# Patient Record
Sex: FEMALE | Race: WHITE | NOT HISPANIC OR LATINO | Employment: FULL TIME | URBAN - METROPOLITAN AREA
[De-identification: names, ages, dates, MRNs, and addresses within clinical notes are randomized per-mention and may not be internally consistent; named-entity substitution may affect disease eponyms.]

---

## 2023-04-13 ENCOUNTER — DOCUMENTATION (OUTPATIENT)
Dept: CARE COORDINATION | Facility: CLINIC | Age: 28
End: 2023-04-13
Payer: COMMERCIAL

## 2023-06-15 DIAGNOSIS — F33.40 RECURRENT MAJOR DEPRESSIVE DISORDER, IN REMISSION (CMS-HCC): Primary | ICD-10-CM

## 2023-06-15 PROBLEM — R00.2 HEART PALPITATIONS: Status: ACTIVE | Noted: 2023-06-15

## 2023-06-15 PROBLEM — J30.1 SEASONAL ALLERGIC RHINITIS DUE TO POLLEN: Status: ACTIVE | Noted: 2023-06-15

## 2023-06-15 PROBLEM — B35.4 TINEA CORPORIS: Status: ACTIVE | Noted: 2023-06-15

## 2023-06-15 PROBLEM — E78.2 MIXED HYPERLIPIDEMIA: Status: ACTIVE | Noted: 2023-06-15

## 2023-06-15 PROBLEM — K58.1 IRRITABLE BOWEL SYNDROME WITH CONSTIPATION: Status: ACTIVE | Noted: 2023-06-15

## 2023-06-15 PROBLEM — E53.8 FOLATE DEFICIENCY: Status: ACTIVE | Noted: 2023-06-15

## 2023-06-15 PROBLEM — R00.0 SINUS TACHYCARDIA: Status: ACTIVE | Noted: 2023-06-15

## 2023-06-15 PROBLEM — J45.30 ASTHMA, MILD PERSISTENT (HHS-HCC): Status: ACTIVE | Noted: 2023-06-15

## 2023-06-15 PROBLEM — R79.89 LOW TSH LEVEL: Status: ACTIVE | Noted: 2023-06-15

## 2023-06-15 PROBLEM — M54.50 CHRONIC BILATERAL LOW BACK PAIN WITHOUT SCIATICA: Status: ACTIVE | Noted: 2023-06-15

## 2023-06-15 PROBLEM — M54.2 CERVICALGIA: Status: ACTIVE | Noted: 2023-06-15

## 2023-06-15 PROBLEM — N63.13 MASS OF LOWER OUTER QUADRANT OF RIGHT BREAST: Status: ACTIVE | Noted: 2023-06-15

## 2023-06-15 PROBLEM — F32.9 MAJOR DEPRESSION: Status: ACTIVE | Noted: 2023-06-15

## 2023-06-15 PROBLEM — E53.8 VITAMIN B12 DEFICIENCY: Status: ACTIVE | Noted: 2023-06-15

## 2023-06-15 PROBLEM — R63.4 ABNORMAL WEIGHT LOSS: Status: ACTIVE | Noted: 2023-06-15

## 2023-06-15 PROBLEM — N20.0 KIDNEY STONE ON RIGHT SIDE: Status: ACTIVE | Noted: 2023-06-15

## 2023-06-15 PROBLEM — M99.09 SEGMENTAL AND SOMATIC DYSFUNCTION: Status: ACTIVE | Noted: 2023-06-15

## 2023-06-15 PROBLEM — R09.89 RESPIRATORY SYMPTOMS: Status: ACTIVE | Noted: 2023-06-15

## 2023-06-15 PROBLEM — G89.29 CHRONIC BILATERAL LOW BACK PAIN WITHOUT SCIATICA: Status: ACTIVE | Noted: 2023-06-15

## 2023-06-15 PROBLEM — F41.8 POSTPARTUM ANXIETY (HHS-HCC): Status: ACTIVE | Noted: 2023-06-15

## 2023-06-15 RX ORDER — MONTELUKAST SODIUM 10 MG/1
1 TABLET ORAL DAILY
COMMUNITY
Start: 2022-05-11 | End: 2024-01-22 | Stop reason: SDUPTHER

## 2023-06-15 RX ORDER — ESCITALOPRAM OXALATE 10 MG/1
10 TABLET ORAL DAILY
Qty: 90 TABLET | Refills: 3 | Status: SHIPPED | OUTPATIENT
Start: 2023-06-15 | End: 2023-08-02 | Stop reason: ALTCHOICE

## 2023-06-15 RX ORDER — NEBIVOLOL 5 MG/1
1 TABLET ORAL DAILY
COMMUNITY
End: 2024-01-22 | Stop reason: SDUPTHER

## 2023-06-15 RX ORDER — ALBUTEROL SULFATE 90 UG/1
AEROSOL, METERED RESPIRATORY (INHALATION)
COMMUNITY
Start: 2021-09-27 | End: 2023-10-31 | Stop reason: ALTCHOICE

## 2023-06-15 RX ORDER — ESCITALOPRAM OXALATE 10 MG/1
10 TABLET ORAL DAILY
COMMUNITY
Start: 2023-05-17 | End: 2023-06-15 | Stop reason: SDUPTHER

## 2023-06-19 ENCOUNTER — OFFICE VISIT (OUTPATIENT)
Dept: PRIMARY CARE | Facility: CLINIC | Age: 28
End: 2023-06-19
Payer: COMMERCIAL

## 2023-06-19 ENCOUNTER — TELEPHONE (OUTPATIENT)
Dept: PRIMARY CARE | Facility: CLINIC | Age: 28
End: 2023-06-19

## 2023-06-19 VITALS
WEIGHT: 144.4 LBS | BODY MASS INDEX: 27.26 KG/M2 | HEART RATE: 80 BPM | SYSTOLIC BLOOD PRESSURE: 104 MMHG | HEIGHT: 61 IN | DIASTOLIC BLOOD PRESSURE: 66 MMHG | TEMPERATURE: 97.5 F | OXYGEN SATURATION: 99 %

## 2023-06-19 DIAGNOSIS — Z72.0 TOBACCO ABUSE: Primary | ICD-10-CM

## 2023-06-19 DIAGNOSIS — F17.200 NICOTINE USE DISORDER: ICD-10-CM

## 2023-06-19 DIAGNOSIS — L70.0 ACNE VULGARIS: Primary | ICD-10-CM

## 2023-06-19 DIAGNOSIS — F33.42 RECURRENT MAJOR DEPRESSIVE DISORDER, IN FULL REMISSION (CMS-HCC): ICD-10-CM

## 2023-06-19 DIAGNOSIS — F17.200 VAPING NICOTINE DEPENDENCE, NON-TOBACCO PRODUCT: ICD-10-CM

## 2023-06-19 DIAGNOSIS — R51.9 NONINTRACTABLE EPISODIC HEADACHE, UNSPECIFIED HEADACHE TYPE: Primary | ICD-10-CM

## 2023-06-19 PROCEDURE — 99214 OFFICE O/P EST MOD 30 MIN: CPT | Performed by: FAMILY MEDICINE

## 2023-06-19 RX ORDER — DIPHENHYDRAMINE HCL 25 MG
4 CAPSULE ORAL AS NEEDED
Qty: 100 EACH | Refills: 0 | Status: SHIPPED | OUTPATIENT
Start: 2023-06-19 | End: 2023-06-19 | Stop reason: SDUPTHER

## 2023-06-19 RX ORDER — TRETINOIN 0.1 MG/G
GEL TOPICAL NIGHTLY
Qty: 45 G | Refills: 11 | Status: SHIPPED | OUTPATIENT
Start: 2023-06-19 | End: 2023-08-02 | Stop reason: ALTCHOICE

## 2023-06-19 RX ORDER — DIPHENHYDRAMINE HCL 25 MG
4 CAPSULE ORAL AS NEEDED
Qty: 160 EACH | Refills: 1 | Status: SHIPPED | OUTPATIENT
Start: 2023-06-19 | End: 2023-10-31 | Stop reason: ALTCHOICE

## 2023-06-19 NOTE — PROGRESS NOTES
[facility] needs a new script for the nicotine gum, they are needing a frequency on the directions.

## 2023-06-20 NOTE — PROGRESS NOTES
Subjective   Patient ID: Roxanna Hargrove is a 28 y.o. female who presents for Follow-up.  HPI  The patient is coming to the office today with a chief complaint of fatigue, worsening depression and headaches.  She is currently taking Lexapro 10 mg and she feels its not working well.  When asked how long she feels its been since its stopped working she says about a week.  States that she is sleeping well and denies any thoughts of hurting herself or anybody else.  Feels that her motivation is down.  Does not have the drive to do as much work was accomplished as much.  She is getting 8 hours of sleep.  I had her fill out PHQ-9 and JEMMA-7 and her PHQ-9 was a score of 6.  Her biggest complaints were little interest or pleasure in doing things and having little energy which were both rated as a 2 instead of 3.  Her anxiety scale was JEMMA-7 and was a score of 3.  She also started to have headaches over the past week.  Has 1 every couple days.  It is just an irritating headache and not severe.  Occurring on the right side.  Excedrin taken only as needed helps and she takes it 1-2 times a week.  She denies any nausea or vomiting, changes in her vision, denies that lights bother her eyes or sounds bother her ears.  We discussed the possibility of mold since last week it had drained and she admits that her basement is wet but they have dehumidifier's that are try to dry it out fast.  When asked about nicotine use and the patient states that she has been using her nicotine vape much more.  As we further discussed she recognizes that her nicotine vape has caused her to have more headaches and sees the association between the 2.  She does have a desire to quit using nicotine vape.  Review of Systems    Objective   Physical Exam  General: Patient is alert and orient x3 and appears in no acute distress.      Neck: Decreased range of motion in all planes    Heart: Regular rate and rhythm no murmurs clicks or gallops    Lungs: Clear to  auscultation bilaterally without rhonchi rales or wheezing      Musculoskeletal: Strength was grossly intact.  Deep tendon reflexes intact.  Sensation intact.  Decreased range of motion          Assessment/Plan   Problem List Items Addressed This Visit    None  Depression and anxiety  - PHQ-9 with a score of 6 and JEMMA-7 was a score of 3.  Discussed that we will going to stay on Lexapro 10 mg daily.  I do not feel that she needs to change it just yet but we did discuss taking it at nighttime and seeing if this helps out with her fatigue.    Headaches  - Seems to be associated with her increase in vaping.  We are going to have her start nicotine gum to try and get away from the vaping.  Our hope is that we will be able to decrease the nicotine gum and wean away from the nicotine altogether.

## 2023-07-12 ENCOUNTER — TELEPHONE (OUTPATIENT)
Dept: PRIMARY CARE | Facility: CLINIC | Age: 28
End: 2023-07-12
Payer: COMMERCIAL

## 2023-07-12 NOTE — TELEPHONE ENCOUNTER
Roxanna called and asked if you could please send in her nicotine patches and also wanted to know if you would switch her med to paroxetine?

## 2023-07-14 DIAGNOSIS — F17.200 NICOTINE USE DISORDER: Primary | ICD-10-CM

## 2023-07-14 RX ORDER — IBUPROFEN 200 MG
1 TABLET ORAL EVERY 24 HOURS
Qty: 30 PATCH | Refills: 0 | Status: SHIPPED | OUTPATIENT
Start: 2023-07-14 | End: 2023-08-02 | Stop reason: ALTCHOICE

## 2023-07-25 ENCOUNTER — APPOINTMENT (OUTPATIENT)
Dept: PRIMARY CARE | Facility: CLINIC | Age: 28
End: 2023-07-25
Payer: COMMERCIAL

## 2023-07-25 ENCOUNTER — OFFICE VISIT (OUTPATIENT)
Dept: PRIMARY CARE | Facility: CLINIC | Age: 28
End: 2023-07-25
Payer: COMMERCIAL

## 2023-07-25 DIAGNOSIS — F33.42 RECURRENT MAJOR DEPRESSIVE DISORDER, IN FULL REMISSION (CMS-HCC): Primary | ICD-10-CM

## 2023-07-25 PROCEDURE — 99213 OFFICE O/P EST LOW 20 MIN: CPT | Performed by: FAMILY MEDICINE

## 2023-07-25 RX ORDER — PAROXETINE HYDROCHLORIDE 20 MG/1
20 TABLET, FILM COATED ORAL EVERY MORNING
Qty: 30 TABLET | Refills: 1 | Status: SHIPPED | OUTPATIENT
Start: 2023-07-25 | End: 2023-08-21 | Stop reason: ALTCHOICE

## 2023-07-25 ASSESSMENT — PATIENT HEALTH QUESTIONNAIRE - PHQ9
2. FEELING DOWN, DEPRESSED OR HOPELESS: NEARLY EVERY DAY
10. IF YOU CHECKED OFF ANY PROBLEMS, HOW DIFFICULT HAVE THESE PROBLEMS MADE IT FOR YOU TO DO YOUR WORK, TAKE CARE OF THINGS AT HOME, OR GET ALONG WITH OTHER PEOPLE: VERY DIFFICULT
SUM OF ALL RESPONSES TO PHQ9 QUESTIONS 1 AND 2: 6
1. LITTLE INTEREST OR PLEASURE IN DOING THINGS: NEARLY EVERY DAY

## 2023-07-25 NOTE — PROGRESS NOTES
Subjective   Patient ID: Roxanna Hargrove is a 28 y.o. female who presents for Follow-up (Discuss meds).  HPI  Has worsening energy on th lexapro. 2/10 on a scale of 1-10.  She is sleeping too much.  She took 3 hour nap and feels like she could sleep at anytime.   Not having irregular bleeding.  Stress is doing well.     Duloxetine did alright on.  Wellbutrin made her more emotional  No other medications.   Review of Systems    Objective   Physical Exam  General: Patient is alert and oriented x3 and appears in no acute distress    Heart: Regular rate and rhythm    Lungs: Clear to auscultation    Extremities: No cyanosis clubbing or edema  Assessment/Plan   Problem List Items Addressed This Visit       Major depression - Primary    Relevant Medications    PARoxetine (Paxil) 20 mg tablet   Paroxetine 20 mg daily started  Lexapro 5 mg daily continued  Follow up in 4 weeks  PHQ9 was 14 and JEMMA 7 was 3

## 2023-07-28 LAB
BASOPHILS (10*3/UL) IN BLOOD BY AUTOMATED COUNT: 0.05 X10E9/L (ref 0–0.1)
BASOPHILS/100 LEUKOCYTES IN BLOOD BY AUTOMATED COUNT: 0.6 % (ref 0–2)
CHORIOGONADOTROPIN (MIU/ML) IN SER/PLAS: <2 MIU/ML
DEHYDROEPIANDROSTERONE SULFATE (DHEA-S) (UG/DL) IN SER/: 270 UG/DL (ref 65–395)
EOSINOPHILS (10*3/UL) IN BLOOD BY AUTOMATED COUNT: 0.05 X10E9/L (ref 0–0.7)
EOSINOPHILS/100 LEUKOCYTES IN BLOOD BY AUTOMATED COUNT: 0.6 % (ref 0–6)
ERYTHROCYTE DISTRIBUTION WIDTH (RATIO) BY AUTOMATED COUNT: 13.3 % (ref 11.5–14.5)
ERYTHROCYTE MEAN CORPUSCULAR HEMOGLOBIN CONCENTRATION (G/DL) BY AUTOMATED: 31.5 G/DL (ref 32–36)
ERYTHROCYTE MEAN CORPUSCULAR VOLUME (FL) BY AUTOMATED COUNT: 86 FL (ref 80–100)
ERYTHROCYTES (10*6/UL) IN BLOOD BY AUTOMATED COUNT: 4.81 X10E12/L (ref 4–5.2)
ESTRADIOL (PG/ML) IN SER/PLAS: 40 PG/ML
FOLLITROPIN (IU/L) IN SER/PLAS: 7.5 IU/L
HEMATOCRIT (%) IN BLOOD BY AUTOMATED COUNT: 41.3 % (ref 36–46)
HEMOGLOBIN (G/DL) IN BLOOD: 13 G/DL (ref 12–16)
IMMATURE GRANULOCYTES/100 LEUKOCYTES IN BLOOD BY AUTOMATED COUNT: 0.2 % (ref 0–0.9)
LEUKOCYTES (10*3/UL) IN BLOOD BY AUTOMATED COUNT: 8.9 X10E9/L (ref 4.4–11.3)
LYMPHOCYTES (10*3/UL) IN BLOOD BY AUTOMATED COUNT: 2.95 X10E9/L (ref 1.2–4.8)
LYMPHOCYTES/100 LEUKOCYTES IN BLOOD BY AUTOMATED COUNT: 33 % (ref 13–44)
MONOCYTES (10*3/UL) IN BLOOD BY AUTOMATED COUNT: 0.64 X10E9/L (ref 0.1–1)
MONOCYTES/100 LEUKOCYTES IN BLOOD BY AUTOMATED COUNT: 7.2 % (ref 2–10)
NEUTROPHILS (10*3/UL) IN BLOOD BY AUTOMATED COUNT: 5.22 X10E9/L (ref 1.2–7.7)
NEUTROPHILS/100 LEUKOCYTES IN BLOOD BY AUTOMATED COUNT: 58.4 % (ref 40–80)
PLATELETS (10*3/UL) IN BLOOD AUTOMATED COUNT: 290 X10E9/L (ref 150–450)
PROGESTERONE (NG/ML) IN SER/PLAS: 0.7 NG/ML
PROLACTIN (UG/L) IN SER/PLAS: 10 UG/L (ref 3–20)
TESTOSTERONE (NG/DL) IN SER/PLAS: <60 NG/DL (ref 0–70)
THYROTROPIN (MIU/L) IN SER/PLAS BY DETECTION LIMIT <= 0.05 MIU/L: 1 MIU/L (ref 0.44–3.98)

## 2023-08-02 ENCOUNTER — TELEPHONE (OUTPATIENT)
Dept: PRIMARY CARE | Facility: CLINIC | Age: 28
End: 2023-08-02
Payer: COMMERCIAL

## 2023-08-02 DIAGNOSIS — J01.00 ACUTE NON-RECURRENT MAXILLARY SINUSITIS: Primary | ICD-10-CM

## 2023-08-02 RX ORDER — AZITHROMYCIN 250 MG/1
TABLET, FILM COATED ORAL
Qty: 6 TABLET | Refills: 0 | Status: SHIPPED | OUTPATIENT
Start: 2023-08-02 | End: 2023-08-07

## 2023-08-25 DIAGNOSIS — E55.9 VITAMIN D DEFICIENCY: ICD-10-CM

## 2023-08-25 DIAGNOSIS — G43.019 INTRACTABLE MIGRAINE WITHOUT AURA AND WITHOUT STATUS MIGRAINOSUS: Primary | ICD-10-CM

## 2023-08-25 RX ORDER — SUMATRIPTAN SUCCINATE 100 MG/1
100 TABLET ORAL ONCE AS NEEDED
Qty: 10 TABLET | Refills: 3 | Status: SHIPPED | OUTPATIENT
Start: 2023-08-25 | End: 2023-08-25 | Stop reason: SDUPTHER

## 2023-08-25 RX ORDER — SUMATRIPTAN SUCCINATE 100 MG/1
100 TABLET ORAL ONCE AS NEEDED
Qty: 10 TABLET | Refills: 3 | Status: SHIPPED | OUTPATIENT
Start: 2023-08-25 | End: 2023-10-31 | Stop reason: SINTOL

## 2023-08-30 RX ORDER — ERGOCALCIFEROL 1.25 MG/1
1.25 CAPSULE ORAL
COMMUNITY
End: 2023-08-30 | Stop reason: SDUPTHER

## 2023-08-30 RX ORDER — ERGOCALCIFEROL 1.25 MG/1
1.25 CAPSULE ORAL
Qty: 12 CAPSULE | Refills: 1 | Status: SHIPPED | OUTPATIENT
Start: 2023-08-30 | End: 2023-10-31 | Stop reason: ALTCHOICE

## 2023-09-05 ENCOUNTER — TELEPHONE (OUTPATIENT)
Dept: PRIMARY CARE | Facility: CLINIC | Age: 28
End: 2023-09-05
Payer: COMMERCIAL

## 2023-09-05 RX ORDER — VENLAFAXINE 25 MG/1
25 TABLET ORAL DAILY
OUTPATIENT
Start: 2023-09-05

## 2023-09-10 DIAGNOSIS — J01.00 ACUTE NON-RECURRENT MAXILLARY SINUSITIS: Primary | ICD-10-CM

## 2023-09-10 RX ORDER — DOXYCYCLINE 100 MG/1
100 CAPSULE ORAL 2 TIMES DAILY
Qty: 14 CAPSULE | Refills: 0 | Status: SHIPPED | OUTPATIENT
Start: 2023-09-10 | End: 2023-09-17

## 2023-09-14 ENCOUNTER — TELEPHONE (OUTPATIENT)
Dept: PRIMARY CARE | Facility: CLINIC | Age: 28
End: 2023-09-14
Payer: COMMERCIAL

## 2023-09-14 DIAGNOSIS — N30.00 ACUTE CYSTITIS WITHOUT HEMATURIA: Primary | ICD-10-CM

## 2023-09-14 RX ORDER — NITROFURANTOIN 25; 75 MG/1; MG/1
100 CAPSULE ORAL 2 TIMES DAILY
Qty: 10 CAPSULE | Refills: 0 | Status: SHIPPED | OUTPATIENT
Start: 2023-09-14 | End: 2023-09-19

## 2023-09-19 DIAGNOSIS — F33.42 RECURRENT MAJOR DEPRESSIVE DISORDER, IN FULL REMISSION (CMS-HCC): Primary | ICD-10-CM

## 2023-09-19 RX ORDER — VENLAFAXINE 25 MG/1
25 TABLET ORAL 2 TIMES DAILY
Qty: 180 TABLET | Refills: 0 | Status: SHIPPED | OUTPATIENT
Start: 2023-09-19 | End: 2023-10-31 | Stop reason: SINTOL

## 2023-10-02 VITALS
WEIGHT: 160 LBS | SYSTOLIC BLOOD PRESSURE: 110 MMHG | OXYGEN SATURATION: 99 % | TEMPERATURE: 97.3 F | HEART RATE: 72 BPM | DIASTOLIC BLOOD PRESSURE: 70 MMHG | HEIGHT: 61 IN | BODY MASS INDEX: 30.21 KG/M2

## 2023-10-31 ENCOUNTER — APPOINTMENT (OUTPATIENT)
Dept: PRIMARY CARE | Facility: CLINIC | Age: 28
End: 2023-10-31

## 2023-10-31 ENCOUNTER — OFFICE VISIT (OUTPATIENT)
Dept: PRIMARY CARE | Facility: CLINIC | Age: 28
End: 2023-10-31
Payer: COMMERCIAL

## 2023-10-31 VITALS
BODY MASS INDEX: 30.58 KG/M2 | RESPIRATION RATE: 16 BRPM | HEIGHT: 61 IN | HEART RATE: 109 BPM | SYSTOLIC BLOOD PRESSURE: 110 MMHG | WEIGHT: 162 LBS | DIASTOLIC BLOOD PRESSURE: 74 MMHG | OXYGEN SATURATION: 98 %

## 2023-10-31 DIAGNOSIS — R41.840 INATTENTION: Primary | ICD-10-CM

## 2023-10-31 PROBLEM — U07.1 COVID: Status: ACTIVE | Noted: 2023-10-31

## 2023-10-31 PROBLEM — O35.03X0: Status: RESOLVED | Noted: 2023-10-31 | Resolved: 2023-10-31

## 2023-10-31 PROBLEM — F41.1 GENERALIZED ANXIETY DISORDER: Status: ACTIVE | Noted: 2023-10-31

## 2023-10-31 PROBLEM — B37.9 YEAST INFECTION: Status: RESOLVED | Noted: 2023-10-31 | Resolved: 2023-10-31

## 2023-10-31 PROBLEM — R73.03 PREDIABETES: Status: ACTIVE | Noted: 2023-10-31

## 2023-10-31 PROBLEM — O35.EXX0 PYELECTASIS OF FETUS ON PRENATAL ULTRASOUND (HHS-HCC): Status: RESOLVED | Noted: 2023-10-31 | Resolved: 2023-10-31

## 2023-10-31 PROBLEM — J01.00 ACUTE ANTRITIS: Status: RESOLVED | Noted: 2023-10-31 | Resolved: 2023-10-31

## 2023-10-31 PROBLEM — R25.1 SHAKY: Status: ACTIVE | Noted: 2023-10-31

## 2023-10-31 PROBLEM — R05.9 COUGH: Status: RESOLVED | Noted: 2023-10-31 | Resolved: 2023-10-31

## 2023-10-31 PROBLEM — L02.92 BOIL: Status: RESOLVED | Noted: 2023-10-31 | Resolved: 2023-10-31

## 2023-10-31 PROBLEM — M99.09 SEGMENTAL AND SOMATIC DYSFUNCTION: Status: RESOLVED | Noted: 2023-06-15 | Resolved: 2023-10-31

## 2023-10-31 PROBLEM — N39.0 ACUTE UTI: Status: ACTIVE | Noted: 2023-10-31

## 2023-10-31 PROBLEM — R00.0 SINUS TACHYCARDIA: Status: RESOLVED | Noted: 2023-06-15 | Resolved: 2023-10-31

## 2023-10-31 PROBLEM — F51.02 INSOMNIA, TRANSIENT: Status: ACTIVE | Noted: 2023-10-31

## 2023-10-31 PROBLEM — O35.EXX0 PYELECTASIS OF FETUS ON PRENATAL ULTRASOUND (HHS-HCC): Status: ACTIVE | Noted: 2023-10-31

## 2023-10-31 PROBLEM — N92.6 IRREGULAR BLEEDING: Status: ACTIVE | Noted: 2023-10-31

## 2023-10-31 PROBLEM — L02.91 ABSCESS: Status: ACTIVE | Noted: 2023-10-31

## 2023-10-31 PROBLEM — R53.83 FATIGUE: Status: ACTIVE | Noted: 2023-10-31

## 2023-10-31 PROBLEM — Z20.822 SUSPECTED COVID-19 VIRUS INFECTION: Status: ACTIVE | Noted: 2023-10-31

## 2023-10-31 PROBLEM — L73.9 FOLLICULITIS: Status: RESOLVED | Noted: 2023-10-31 | Resolved: 2023-10-31

## 2023-10-31 PROBLEM — Z20.822 SUSPECTED COVID-19 VIRUS INFECTION: Status: RESOLVED | Noted: 2023-10-31 | Resolved: 2023-10-31

## 2023-10-31 PROBLEM — H66.001 NON-RECURRENT ACUTE SUPPURATIVE OTITIS MEDIA OF RIGHT EAR WITHOUT SPONTANEOUS RUPTURE OF TYMPANIC MEMBRANE: Status: RESOLVED | Noted: 2023-10-31 | Resolved: 2023-10-31

## 2023-10-31 PROBLEM — R10.2 PELVIC PAIN IN FEMALE: Status: RESOLVED | Noted: 2023-10-31 | Resolved: 2023-10-31

## 2023-10-31 PROBLEM — R25.1 SHAKY: Status: RESOLVED | Noted: 2023-10-31 | Resolved: 2023-10-31

## 2023-10-31 PROBLEM — G43.909 MIGRAINES: Status: ACTIVE | Noted: 2023-10-31

## 2023-10-31 PROBLEM — R79.89 LOW TSH LEVEL: Status: RESOLVED | Noted: 2023-06-15 | Resolved: 2023-10-31

## 2023-10-31 PROBLEM — B35.4 TINEA CORPORIS: Status: RESOLVED | Noted: 2023-06-15 | Resolved: 2023-10-31

## 2023-10-31 PROBLEM — Z20.822 EXPOSURE TO COVID-19 VIRUS: Status: ACTIVE | Noted: 2023-10-31

## 2023-10-31 PROBLEM — H66.001 NON-RECURRENT ACUTE SUPPURATIVE OTITIS MEDIA OF RIGHT EAR WITHOUT SPONTANEOUS RUPTURE OF TYMPANIC MEMBRANE: Status: ACTIVE | Noted: 2023-10-31

## 2023-10-31 PROBLEM — R05.9 COUGH: Status: ACTIVE | Noted: 2023-10-31

## 2023-10-31 PROBLEM — R09.81 CONGESTION OF NASAL SINUS: Status: ACTIVE | Noted: 2023-10-31

## 2023-10-31 PROBLEM — J40 BRONCHITIS: Status: RESOLVED | Noted: 2023-10-31 | Resolved: 2023-10-31

## 2023-10-31 PROBLEM — Z20.822 EXPOSURE TO COVID-19 VIRUS: Status: RESOLVED | Noted: 2023-10-31 | Resolved: 2023-10-31

## 2023-10-31 PROBLEM — O35.03X0: Status: ACTIVE | Noted: 2023-10-31

## 2023-10-31 PROBLEM — M54.2 DORSALGIA OF CERVICOTHORACIC REGION: Status: RESOLVED | Noted: 2023-10-31 | Resolved: 2023-10-31

## 2023-10-31 PROBLEM — O21.0 HYPEREMESIS GRAVIDARUM (HHS-HCC): Status: RESOLVED | Noted: 2023-10-31 | Resolved: 2023-10-31

## 2023-10-31 PROBLEM — R52 BODY ACHES: Status: RESOLVED | Noted: 2023-10-31 | Resolved: 2023-10-31

## 2023-10-31 PROBLEM — R09.81 CONGESTION OF NASAL SINUS: Status: RESOLVED | Noted: 2023-10-31 | Resolved: 2023-10-31

## 2023-10-31 PROBLEM — M54.6 DORSALGIA OF CERVICOTHORACIC REGION: Status: ACTIVE | Noted: 2023-10-31

## 2023-10-31 PROBLEM — R30.0 DYSURIA: Status: RESOLVED | Noted: 2023-10-31 | Resolved: 2023-10-31

## 2023-10-31 PROBLEM — J40 BRONCHITIS: Status: ACTIVE | Noted: 2023-10-31

## 2023-10-31 PROBLEM — R73.02 IGT (IMPAIRED GLUCOSE TOLERANCE): Status: ACTIVE | Noted: 2023-10-31

## 2023-10-31 PROBLEM — H60.90 OTITIS EXTERNA: Status: ACTIVE | Noted: 2023-10-31

## 2023-10-31 PROBLEM — N39.0 ACUTE UTI: Status: RESOLVED | Noted: 2023-10-31 | Resolved: 2023-10-31

## 2023-10-31 PROBLEM — U07.1 COVID: Status: RESOLVED | Noted: 2023-10-31 | Resolved: 2023-10-31

## 2023-10-31 PROBLEM — R63.4 ABNORMAL WEIGHT LOSS: Status: RESOLVED | Noted: 2023-06-15 | Resolved: 2023-10-31

## 2023-10-31 PROBLEM — F41.8 POSTPARTUM ANXIETY (HHS-HCC): Status: RESOLVED | Noted: 2023-06-15 | Resolved: 2023-10-31

## 2023-10-31 PROBLEM — B37.9 YEAST INFECTION: Status: ACTIVE | Noted: 2023-10-31

## 2023-10-31 PROBLEM — M54.6 DORSALGIA OF CERVICOTHORACIC REGION: Status: RESOLVED | Noted: 2023-10-31 | Resolved: 2023-10-31

## 2023-10-31 PROBLEM — L73.9 FOLLICULITIS: Status: ACTIVE | Noted: 2023-10-31

## 2023-10-31 PROBLEM — L02.92 BOIL: Status: ACTIVE | Noted: 2023-10-31

## 2023-10-31 PROBLEM — R09.89 RESPIRATORY SYMPTOMS: Status: RESOLVED | Noted: 2023-06-15 | Resolved: 2023-10-31

## 2023-10-31 PROBLEM — R52 BODY ACHES: Status: ACTIVE | Noted: 2023-10-31

## 2023-10-31 PROBLEM — R53.83 FATIGUE: Status: RESOLVED | Noted: 2023-10-31 | Resolved: 2023-10-31

## 2023-10-31 PROBLEM — M54.2 CERVICALGIA: Status: RESOLVED | Noted: 2023-06-15 | Resolved: 2023-10-31

## 2023-10-31 PROBLEM — O21.0 HYPEREMESIS GRAVIDARUM (HHS-HCC): Status: ACTIVE | Noted: 2023-10-31

## 2023-10-31 PROBLEM — M54.2 DORSALGIA OF CERVICOTHORACIC REGION: Status: ACTIVE | Noted: 2023-10-31

## 2023-10-31 PROBLEM — R10.2 PELVIC PAIN IN FEMALE: Status: ACTIVE | Noted: 2023-10-31

## 2023-10-31 PROBLEM — R30.0 DYSURIA: Status: ACTIVE | Noted: 2023-10-31

## 2023-10-31 PROBLEM — H60.90 OTITIS EXTERNA: Status: RESOLVED | Noted: 2023-10-31 | Resolved: 2023-10-31

## 2023-10-31 PROBLEM — J01.00 ACUTE ANTRITIS: Status: ACTIVE | Noted: 2023-10-31

## 2023-10-31 PROCEDURE — 99213 OFFICE O/P EST LOW 20 MIN: CPT | Performed by: FAMILY MEDICINE

## 2023-10-31 PROCEDURE — 1036F TOBACCO NON-USER: CPT | Performed by: FAMILY MEDICINE

## 2023-10-31 RX ORDER — ATOMOXETINE 40 MG/1
40 CAPSULE ORAL DAILY
Qty: 30 CAPSULE | Refills: 0 | Status: SHIPPED | OUTPATIENT
Start: 2023-10-31 | End: 2023-11-07 | Stop reason: ALTCHOICE

## 2023-10-31 ASSESSMENT — ENCOUNTER SYMPTOMS
NAUSEA: 0
CHILLS: 0
SINUS PAIN: 0
ABDOMINAL PAIN: 0
HEMATURIA: 0
DYSURIA: 0
NUMBNESS: 0
SHORTNESS OF BREATH: 0
WHEEZING: 0
DIAPHORESIS: 0
PALPITATIONS: 0
FREQUENCY: 0
POLYDIPSIA: 0
CONSTIPATION: 0
NERVOUS/ANXIOUS: 0
VOMITING: 0
FEVER: 0
UNEXPECTED WEIGHT CHANGE: 0
SINUS PRESSURE: 0
CONFUSION: 0
CHEST TIGHTNESS: 0
DIARRHEA: 0
DECREASED CONCENTRATION: 1
COUGH: 0
DYSPHORIC MOOD: 0
ADENOPATHY: 0
SORE THROAT: 0
POLYPHAGIA: 0
DIZZINESS: 0
HEADACHES: 0
LIGHT-HEADEDNESS: 0

## 2023-10-31 NOTE — PATIENT INSTRUCTIONS
Roxanna Hargrove ,    Thank you for coming in today. We at [facility] appreciate your trust in our care. If you have any questions or concerns about the care you received today, please do not hesitate to contact us at [phone].    The following instructions were discussed today:    - START strattera 40 mg daily   - Follow up in 1 month.

## 2023-10-31 NOTE — PROGRESS NOTES
Subjective   Patient ID: Roxanna Hargrove is a 28 y.o. female who presents for medication discussion.    HPI   Patient thinks she may have ADHD. This runs in her family. Has a difficult time focusing and concentrating. Gets overwhelmed easily. She denies depression currently, but does have history of that. Denies thoughts of harm to self or others.  Does have history of tachycardia and is on bystolic for that. Did see cardiology. Has had EKG that showed sinus tachycardia. Has been on wellbutrin in the past but that made her symptoms worse.     Orders Only on 07/28/2023   Component Date Value Ref Range Status    Testosterone 07/28/2023 <60  0 - 70 ng/dL Final    Comment:  Nandrolone decanoate, 11 Beta-hydroxytestosterone, androstenedione,   testosterone propionate and 11-keto-testosterone strongly cross    react with this test method.    Biotin interference may cause falsely elevated results. Patients   taking a Biotin dose of up to 5 mg/day should refrain from taking   Biotin for 24 hours before sample collection. Providers may contact   their local laboratory for further information.   Testing by a more sensitive method (Testosterone Total LC-MS/MS)   is recommended for accurate quantification of testosterone    levels less than 60 ng/dL.      Estradiol 07/28/2023 40  pg/mL Final    Comment: REF VALUES  FOLLICULAR PHASE      MID CYCLE             LUTEAL PHASE          POSTMENOPAUSE         < 32  PREPUBERTY            < 20  FEMALE 10-18Y        8-110  MALE   10-18Y         < 20  ADULT MALE            < 40      WBC 07/28/2023 8.9  4.4 - 11.3 x10E9/L Final    RBC 07/28/2023 4.81  4.00 - 5.20 x10E12/L Final    Hemoglobin 07/28/2023 13.0  12.0 - 16.0 g/dL Final    Hematocrit 07/28/2023 41.3  36.0 - 46.0 % Final    MCV 07/28/2023 86  80 - 100 fL Final    MCHC 07/28/2023 31.5 (L)  32.0 - 36.0 g/dL Final    Platelets 07/28/2023 290  150 - 450 x10E9/L Final    RDW 07/28/2023 13.3  11.5 - 14.5 % Final     Neutrophils % 07/28/2023 58.4  40.0 - 80.0 % Final    Immature Granulocytes %, Automated 07/28/2023 0.2  0.0 - 0.9 % Final    Comment:  Immature Granulocyte Count (IG) includes promyelocytes,    myelocytes and metamyelocytes but does not include bands.   Percent differential counts (%) should be interpreted in the   context of the absolute cell counts (cells/L).      Lymphocytes % 07/28/2023 33.0  13.0 - 44.0 % Final    Monocytes % 07/28/2023 7.2  2.0 - 10.0 % Final    Eosinophils % 07/28/2023 0.6  0.0 - 6.0 % Final    Basophils % 07/28/2023 0.6  0.0 - 2.0 % Final    Neutrophils Absolute 07/28/2023 5.22  1.20 - 7.70 x10E9/L Final    Lymphocytes Absolute 07/28/2023 2.95  1.20 - 4.80 x10E9/L Final    Monocytes Absolute 07/28/2023 0.64  0.10 - 1.00 x10E9/L Final    Eosinophils Absolute 07/28/2023 0.05  0.00 - 0.70 x10E9/L Final    Basophils Absolute 07/28/2023 0.05  0.00 - 0.10 x10E9/L Final    DHEA Sulfate 07/28/2023 270  65 - 395 ug/dL Final    Comment: MATURITY-BASED REFERENCE RANGES:        PUBERTAL (KIMI) STAGE    MALE      FEMALE       ---------------------------------------------------                 I           5 - 265     5 - 125                  II          15 - 380    15 - 150                 III          60 - 505    20 - 535                            IV          65 - 560    35 - 485                      V         165 - 500    75 - 530       Biotin interference may cause falsely elevated results.   Patients taking a Biotin dose of up to 5 mg/day should   refrain from taking Biotin for 24 hours before sample   collection. Providers may contact their local laboratory  for further information.      Prolactin 07/28/2023 10.0  3.0 - 20.0 ug/L Final    TSH 07/28/2023 1.00  0.44 - 3.98 mIU/L Final    Comment:  TSH testing is performed using different testing    methodology at [facility] than at other    [facility]. Direct result comparisons should    only be made within the same method.       hCG Quantitative 07/28/2023 <2  mIU/mL Final    Comment: .  Total HCG measurement is performed using the [employer] Access  Immunoassay which detects intact HCG and free beta HCG subunit.   .  This test is not indicated for use as a tumor marker.  HCG testing is performed using a different test methodology at [facility] than other [facility]. Direct result comparison  should only be made within the same method.   REF VALUES  NONPREGNANT FEMALE <5  MALES              <5      Progesterone 07/28/2023 0.7  ng/mL Final    Comment: REF VALUES  MALE              <0.3- 1.2    FOLLICULAR PHASE  <0.3- 1.4       LUTEAL PHASE       3.3-25.6     MID-LUTEAL PHASE   4.4-28.0  POSTMENOPAUSAL    <0.3- 0.7  PREGNANT FEMALES:     1ST TRIMESTER     11.2- 90.0         2ND TRIMESTER     25.6- 89.4     3RD TRIMESTER     48.4-422.5   .  Patients receiving DHEA-S supplements may show false elevation of  progesterone for results near 1.0 ng/mL. Contact laboratory at  [phone] if alternative testing is needed.      Follicle Stimulating Hormone 07/28/2023 7.5  IU/L Final    Comment: REF VALUES  FOLLICULAR  2-12  MID-CYCLE     12-25  LUTEAL PHASE   2-12  MENOPAUSE       PREPUBERTY    50% ADULT  ADULT MALE     2-10  INFANTS        0-1          Review of Systems   Constitutional:  Negative for chills, diaphoresis, fever and unexpected weight change.   HENT:  Negative for congestion, sinus pressure, sinus pain, sneezing and sore throat.    Respiratory:  Negative for cough, chest tightness, shortness of breath and wheezing.    Cardiovascular:  Negative for chest pain, palpitations and leg swelling.   Gastrointestinal:  Negative for abdominal pain, constipation, diarrhea, nausea and vomiting.   Endocrine: Negative for cold intolerance, heat intolerance, polydipsia, polyphagia and polyuria.   Genitourinary:  Negative for dysuria, frequency, hematuria and urgency.   Neurological:  Negative for dizziness, syncope,  "light-headedness, numbness and headaches.   Hematological:  Negative for adenopathy.   Psychiatric/Behavioral:  Positive for decreased concentration. Negative for confusion and dysphoric mood. The patient is not nervous/anxious.        Objective   /74 (BP Location: Right arm, Patient Position: Sitting, BP Cuff Size: Large adult)   Pulse 109   Resp 16   Ht 1.549 m (5' 1\")   Wt 73.5 kg (162 lb)   SpO2 98%   BMI 30.61 kg/m²     Physical Exam  Vitals and nursing note reviewed.   Constitutional:       General: She is not in acute distress.     Appearance: Normal appearance.   HENT:      Head: Normocephalic and atraumatic.      Nose: Nose normal.   Eyes:      Extraocular Movements: Extraocular movements intact.      Conjunctiva/sclera: Conjunctivae normal.      Pupils: Pupils are equal, round, and reactive to light.   Cardiovascular:      Rate and Rhythm: Normal rate and regular rhythm.      Heart sounds: No murmur heard.     No friction rub. No gallop.   Pulmonary:      Effort: Pulmonary effort is normal.      Breath sounds: Normal breath sounds. No wheezing, rhonchi or rales.   Abdominal:      General: Bowel sounds are normal. There is no distension.      Palpations: Abdomen is soft.      Tenderness: There is no abdominal tenderness.   Musculoskeletal:         General: Normal range of motion.      Cervical back: Normal range of motion and neck supple.   Skin:     General: Skin is warm and dry.   Neurological:      General: No focal deficit present.      Mental Status: She is alert and oriented to person, place, and time.      Deep Tendon Reflexes: Reflexes normal.   Psychiatric:         Mood and Affect: Mood normal.         Behavior: Behavior normal.         Thought Content: Thought content normal.         Judgment: Judgment normal.         Assessment/Plan          "

## 2023-11-01 ENCOUNTER — TELEPHONE (OUTPATIENT)
Dept: PRIMARY CARE | Facility: CLINIC | Age: 28
End: 2023-11-01
Payer: COMMERCIAL

## 2023-11-01 DIAGNOSIS — F90.2 ATTENTION DEFICIT HYPERACTIVITY DISORDER (ADHD), COMBINED TYPE: ICD-10-CM

## 2023-11-01 DIAGNOSIS — Z51.81 THERAPEUTIC DRUG MONITORING: Primary | ICD-10-CM

## 2023-11-07 ENCOUNTER — CLINICAL SUPPORT (OUTPATIENT)
Dept: PRIMARY CARE | Facility: CLINIC | Age: 28
End: 2023-11-07
Payer: COMMERCIAL

## 2023-11-07 ENCOUNTER — APPOINTMENT (OUTPATIENT)
Dept: PRIMARY CARE | Facility: CLINIC | Age: 28
End: 2023-11-07
Payer: COMMERCIAL

## 2023-11-07 DIAGNOSIS — F90.9 ATTENTION DEFICIT HYPERACTIVITY DISORDER (ADHD), UNSPECIFIED ADHD TYPE: ICD-10-CM

## 2023-11-08 PROBLEM — F90.2 ATTENTION DEFICIT HYPERACTIVITY DISORDER (ADHD), COMBINED TYPE: Status: ACTIVE | Noted: 2023-11-08

## 2023-11-08 RX ORDER — DEXTROAMPHETAMINE SACCHARATE, AMPHETAMINE ASPARTATE MONOHYDRATE, DEXTROAMPHETAMINE SULFATE AND AMPHETAMINE SULFATE 2.5; 2.5; 2.5; 2.5 MG/1; MG/1; MG/1; MG/1
10 CAPSULE, EXTENDED RELEASE ORAL EVERY MORNING
Qty: 30 CAPSULE | Refills: 0 | Status: SHIPPED | OUTPATIENT
Start: 2023-11-08 | End: 2023-11-21 | Stop reason: DRUGHIGH

## 2023-11-08 NOTE — ASSESSMENT & PLAN NOTE
- unable to tolerate strattera (headaches, nausea). Will start adderall XR 10 mg daily. Will need to closely monitor heart rate and blood pressure. urine drug screen sent and controlled substance agreement signed

## 2023-11-08 NOTE — TELEPHONE ENCOUNTER
Received formal psychological evaluation confirming the diagnosis of ADHD, combined type. Discussed with patient. She was able to get the strattera but is having a difficult time tolerating --> headaches and nausea. Had patient do EKG and it revealed NSR. Heart rate 77. urine drug screen sent and controlled substance agreement signed. Will have patient return to the office in 1 month for nurse visit for blood pressure check and in 3 months for routine follow up.

## 2023-11-09 PROCEDURE — 80324 DRUG SCREEN AMPHETAMINES 1/2: CPT

## 2023-11-09 PROCEDURE — 80307 DRUG TEST PRSMV CHEM ANLYZR: CPT

## 2023-11-10 LAB
AMPHETAMINES UR QL SCN: ABNORMAL
BARBITURATES UR QL SCN: ABNORMAL
BENZODIAZ UR QL SCN: ABNORMAL
BZE UR QL SCN: ABNORMAL
CANNABINOIDS UR QL SCN: ABNORMAL
FENTANYL+NORFENTANYL UR QL SCN: ABNORMAL
OPIATES UR QL SCN: ABNORMAL
OXYCODONE+OXYMORPHONE UR QL SCN: ABNORMAL
PCP UR QL SCN: ABNORMAL

## 2023-11-13 ENCOUNTER — PROCEDURE VISIT (OUTPATIENT)
Dept: PRIMARY CARE | Facility: CLINIC | Age: 28
End: 2023-11-13
Payer: COMMERCIAL

## 2023-11-13 ENCOUNTER — TELEPHONE (OUTPATIENT)
Dept: PRIMARY CARE | Facility: CLINIC | Age: 28
End: 2023-11-13
Payer: COMMERCIAL

## 2023-11-13 DIAGNOSIS — M99.01 SEGMENTAL AND SOMATIC DYSFUNCTION OF CERVICAL REGION: ICD-10-CM

## 2023-11-13 DIAGNOSIS — M99.02 SEGMENTAL AND SOMATIC DYSFUNCTION OF THORACIC REGION: ICD-10-CM

## 2023-11-13 DIAGNOSIS — M99.07 SEGMENTAL AND SOMATIC DYSFUNCTION OF UPPER EXTREMITY: ICD-10-CM

## 2023-11-13 DIAGNOSIS — M99.08 SEGMENTAL AND SOMATIC DYSFUNCTION OF RIB CAGE: ICD-10-CM

## 2023-11-13 DIAGNOSIS — M54.2 DORSALGIA OF CERVICOTHORACIC REGION: Primary | ICD-10-CM

## 2023-11-13 DIAGNOSIS — M54.6 DORSALGIA OF CERVICOTHORACIC REGION: Primary | ICD-10-CM

## 2023-11-13 DIAGNOSIS — M99.00 SEGMENTAL AND SOMATIC DYSFUNCTION OF HEAD REGION: ICD-10-CM

## 2023-11-13 PROCEDURE — 99213 OFFICE O/P EST LOW 20 MIN: CPT | Performed by: FAMILY MEDICINE

## 2023-11-13 PROCEDURE — 98927 OSTEOPATH MANJ 5-6 REGIONS: CPT | Performed by: FAMILY MEDICINE

## 2023-11-13 NOTE — PROGRESS NOTES
Subjective   Patient ID: Roxanna Hargrove is a 28 y.o. female who presents for upper back pain  HPI  Patient is having problems with neck and back pain.  The back pain is occurring in the upper back.  Is a lot of tension.  Denies any pain acute tingling down the extremities or weakness in the extremities.    Review of Systems    Objective   Physical Exam  General: Patient is alert and orient x3 and appears in no acute distress.  Some pain distress.    Neck: Decreased range of motion in all planes    Heart: Regular rate and rhythm no murmurs clicks or gallops    Lungs: Clear to auscultation bilaterally without rhonchi rales or wheezing      Musculoskeletal: Strength was grossly intact.  Deep tendon reflexes intact.  Sensation intact.  Decreased range of motion    Osteopathic structural:  In the head region there is increased suboccipital tension  In the cervical region C2 extended rotated right side bent left  In the rib region first rib on the left was tender to palpation resisted exhalation  In the upper extremity  right upper extremity was protracted inferior tension of pectoralis minor  In the thoracic region  T2 was extended rotated right side bent right, T7 flexed rotated right side bent right        Assessment/Plan   Problem List Items Addressed This Visit    None  Visit Diagnoses       Dorsalgia of cervicothoracic region    -  Primary    Segmental and somatic dysfunction of cervical region        Segmental and somatic dysfunction of head region        Segmental and somatic dysfunction of rib cage        Segmental and somatic dysfunction of thoracic region        Segmental and somatic dysfunction of upper extremity            Osteopathic manipulative therapy was utilized  In the head region as suboccipital release  In the cervical region as functional positional release  In the upper extremity as muscle energy  In the rib region as functional positional release  In the Thoracics as high velocity low amplitude  thrust and muscle energy    Patient tolerated the procedure well and noticed improvement after the treatment.

## 2023-11-13 NOTE — TELEPHONE ENCOUNTER
Can we increase my adderall to 2 or 3 times per day, dose seems too low was previously taking 25mg

## 2023-11-14 NOTE — TELEPHONE ENCOUNTER
It is extended release, so can only be taken once daily. Give it one more week. If still feeling it needs increased then, will need to check your heart rate and blood pressure and then decide if it is safe to increase at that time. Would increase to 20 mg daily.

## 2023-11-17 LAB
AMPHET UR-MCNC: >5000 NG/ML
MDA UR-MCNC: <200 NG/ML
MDEA UR-MCNC: <200 NG/ML
MDMA UR-MCNC: <200 NG/ML
METHAMPHET UR-MCNC: <200 NG/ML
PHENTERMINE UR CFM-MCNC: <200 NG/ML

## 2023-11-21 ENCOUNTER — CLINICAL SUPPORT (OUTPATIENT)
Dept: PRIMARY CARE | Facility: CLINIC | Age: 28
End: 2023-11-21
Payer: COMMERCIAL

## 2023-11-21 VITALS — HEART RATE: 95 BPM | OXYGEN SATURATION: 98 % | DIASTOLIC BLOOD PRESSURE: 74 MMHG | SYSTOLIC BLOOD PRESSURE: 118 MMHG

## 2023-11-21 DIAGNOSIS — F41.9 ANXIETY: ICD-10-CM

## 2023-11-21 DIAGNOSIS — F90.2 ATTENTION DEFICIT HYPERACTIVITY DISORDER (ADHD), COMBINED TYPE: Primary | ICD-10-CM

## 2023-11-21 RX ORDER — DEXTROAMPHETAMINE SACCHARATE, AMPHETAMINE ASPARTATE MONOHYDRATE, DEXTROAMPHETAMINE SULFATE AND AMPHETAMINE SULFATE 5; 5; 5; 5 MG/1; MG/1; MG/1; MG/1
20 CAPSULE, EXTENDED RELEASE ORAL EVERY MORNING
Qty: 30 CAPSULE | Refills: 0 | Status: SHIPPED | OUTPATIENT
Start: 2023-11-28 | End: 2023-12-26 | Stop reason: SDUPTHER

## 2023-11-21 NOTE — TELEPHONE ENCOUNTER
1) I actually prefer extended release. I think it tends to work better. If needed, I will add a low dose of short acting to take later in the day    2) I need to know your blood pressure and heart rate before increasing adderall. Please add yourself as a nurse visit and get those checked to make sure increasing adderall XR is safe.

## 2023-11-21 NOTE — PROGRESS NOTES
1) Blood pressure and pulse good. Per telephone messages (see chart) patient would like to increase her adderall XR. Will increase to 20 mg daily. Nurse visit in 2 weeks to check blood pressure and pulse. Office visit with me in 1 month.     2) can double up on the Adderall XR 10 mg to equal 20 mg. Should have about 14 Adderall XR left, so can do this for one week. I will send in a new script for Adderall XR 20 mg to start on 11/28/23.

## 2023-11-29 ENCOUNTER — TELEPHONE (OUTPATIENT)
Dept: PRIMARY CARE | Facility: CLINIC | Age: 28
End: 2023-11-29
Payer: COMMERCIAL

## 2023-11-29 DIAGNOSIS — F90.2 ATTENTION DEFICIT HYPERACTIVITY DISORDER (ADHD), COMBINED TYPE: Primary | ICD-10-CM

## 2023-11-29 RX ORDER — DEXTROAMPHETAMINE SACCHARATE, AMPHETAMINE ASPARTATE, DEXTROAMPHETAMINE SULFATE AND AMPHETAMINE SULFATE 1.25; 1.25; 1.25; 1.25 MG/1; MG/1; MG/1; MG/1
5 TABLET ORAL DAILY
Qty: 30 TABLET | Refills: 0 | Status: SHIPPED | OUTPATIENT
Start: 2023-11-29 | End: 2024-01-22 | Stop reason: SDUPTHER

## 2023-11-29 NOTE — TELEPHONE ENCOUNTER
Were you going to send the 5mg instant release of adderall as well? You had said before you would send that also   High Risk (score 12 or above)

## 2023-12-21 ENCOUNTER — TELEPHONE (OUTPATIENT)
Dept: PRIMARY CARE | Facility: CLINIC | Age: 28
End: 2023-12-21
Payer: COMMERCIAL

## 2023-12-21 DIAGNOSIS — K21.9 GASTROESOPHAGEAL REFLUX DISEASE WITHOUT ESOPHAGITIS: Primary | ICD-10-CM

## 2023-12-21 RX ORDER — OMEPRAZOLE 20 MG/1
20 CAPSULE, DELAYED RELEASE ORAL DAILY
Qty: 30 CAPSULE | Refills: 11 | Status: SHIPPED | OUTPATIENT
Start: 2023-12-21 | End: 2024-03-07 | Stop reason: WASHOUT

## 2023-12-26 DIAGNOSIS — F90.2 ATTENTION DEFICIT HYPERACTIVITY DISORDER (ADHD), COMBINED TYPE: ICD-10-CM

## 2023-12-26 RX ORDER — DEXTROAMPHETAMINE SACCHARATE, AMPHETAMINE ASPARTATE MONOHYDRATE, DEXTROAMPHETAMINE SULFATE AND AMPHETAMINE SULFATE 5; 5; 5; 5 MG/1; MG/1; MG/1; MG/1
20 CAPSULE, EXTENDED RELEASE ORAL EVERY MORNING
Qty: 30 CAPSULE | Refills: 0 | Status: SHIPPED | OUTPATIENT
Start: 2023-12-26 | End: 2024-01-30 | Stop reason: SDUPTHER

## 2023-12-26 NOTE — TELEPHONE ENCOUNTER
Last appointment: 12/21/2023  Next appointment: Visit date not found     Eprescribed. I have personally reviewed the OARRS report.  This report is scanned into the electronic medical record.  I have considered the risks of abuse, dependence, addiction and diversion.  I believe that it is clinically appropriate to prescribe this medication.      Patient needs 3 month follow up in February. Please have her schedule

## 2023-12-28 ENCOUNTER — TELEPHONE (OUTPATIENT)
Dept: PRIMARY CARE | Facility: CLINIC | Age: 28
End: 2023-12-28
Payer: COMMERCIAL

## 2023-12-28 DIAGNOSIS — F90.2 ATTENTION DEFICIT HYPERACTIVITY DISORDER (ADHD), COMBINED TYPE: ICD-10-CM

## 2023-12-28 DIAGNOSIS — E53.8 VITAMIN B12 DEFICIENCY: ICD-10-CM

## 2023-12-28 DIAGNOSIS — E78.2 MIXED HYPERLIPIDEMIA: ICD-10-CM

## 2023-12-28 DIAGNOSIS — E53.8 FOLATE DEFICIENCY: ICD-10-CM

## 2023-12-28 DIAGNOSIS — R73.03 PREDIABETES: ICD-10-CM

## 2023-12-28 DIAGNOSIS — F41.1 GENERALIZED ANXIETY DISORDER: ICD-10-CM

## 2023-12-28 DIAGNOSIS — R00.2 HEART PALPITATIONS: ICD-10-CM

## 2023-12-28 DIAGNOSIS — J45.30 MILD PERSISTENT ASTHMA WITHOUT COMPLICATION (HHS-HCC): Primary | ICD-10-CM

## 2023-12-28 DIAGNOSIS — F33.42 RECURRENT MAJOR DEPRESSIVE DISORDER, IN FULL REMISSION (CMS-HCC): ICD-10-CM

## 2023-12-28 NOTE — TELEPHONE ENCOUNTER
I placed an order for some fasting labs, but POTS is not usually diagnosed through blood work. Usually a clinical diagnosis. Sometimes a tilt table is needed. Please schedule an appointment to discuss.

## 2024-01-04 ENCOUNTER — APPOINTMENT (OUTPATIENT)
Dept: PRIMARY CARE | Facility: CLINIC | Age: 29
End: 2024-01-04
Payer: COMMERCIAL

## 2024-01-22 ENCOUNTER — TELEPHONE (OUTPATIENT)
Dept: PRIMARY CARE | Facility: CLINIC | Age: 29
End: 2024-01-22

## 2024-01-22 ENCOUNTER — OFFICE VISIT (OUTPATIENT)
Dept: PRIMARY CARE | Facility: CLINIC | Age: 29
End: 2024-01-22
Payer: COMMERCIAL

## 2024-01-22 VITALS
RESPIRATION RATE: 16 BRPM | DIASTOLIC BLOOD PRESSURE: 81 MMHG | HEART RATE: 95 BPM | WEIGHT: 156 LBS | SYSTOLIC BLOOD PRESSURE: 117 MMHG | HEIGHT: 61 IN | OXYGEN SATURATION: 98 % | BODY MASS INDEX: 29.45 KG/M2

## 2024-01-22 DIAGNOSIS — E53.8 VITAMIN B12 DEFICIENCY: ICD-10-CM

## 2024-01-22 DIAGNOSIS — J30.1 SEASONAL ALLERGIC RHINITIS DUE TO POLLEN: ICD-10-CM

## 2024-01-22 DIAGNOSIS — R73.03 PREDIABETES: ICD-10-CM

## 2024-01-22 DIAGNOSIS — F90.2 ATTENTION DEFICIT HYPERACTIVITY DISORDER (ADHD), COMBINED TYPE: Primary | ICD-10-CM

## 2024-01-22 DIAGNOSIS — L70.0 ACNE VULGARIS: ICD-10-CM

## 2024-01-22 DIAGNOSIS — E53.8 FOLATE DEFICIENCY: ICD-10-CM

## 2024-01-22 DIAGNOSIS — E78.2 MIXED HYPERLIPIDEMIA: ICD-10-CM

## 2024-01-22 DIAGNOSIS — F41.1 GENERALIZED ANXIETY DISORDER: ICD-10-CM

## 2024-01-22 DIAGNOSIS — F33.42 RECURRENT MAJOR DEPRESSIVE DISORDER, IN FULL REMISSION (CMS-HCC): ICD-10-CM

## 2024-01-22 DIAGNOSIS — R00.2 HEART PALPITATIONS: ICD-10-CM

## 2024-01-22 DIAGNOSIS — J45.30 MILD PERSISTENT ASTHMA WITHOUT COMPLICATION (HHS-HCC): ICD-10-CM

## 2024-01-22 PROBLEM — F29 PSYCHOSIS, UNSPECIFIED PSYCHOSIS TYPE (MULTI): Status: ACTIVE | Noted: 2024-01-22

## 2024-01-22 PROBLEM — N92.6 IRREGULAR BLEEDING: Status: RESOLVED | Noted: 2023-10-31 | Resolved: 2024-01-22

## 2024-01-22 PROBLEM — F29 PSYCHOSIS, UNSPECIFIED PSYCHOSIS TYPE (MULTI): Status: RESOLVED | Noted: 2024-01-22 | Resolved: 2024-01-22

## 2024-01-22 PROBLEM — E66.3 OVERWEIGHT WITH BODY MASS INDEX (BMI) OF 29 TO 29.9 IN ADULT: Status: ACTIVE | Noted: 2024-01-22

## 2024-01-22 PROBLEM — N63.13 MASS OF LOWER OUTER QUADRANT OF RIGHT BREAST: Status: RESOLVED | Noted: 2023-06-15 | Resolved: 2024-01-22

## 2024-01-22 PROBLEM — G43.109 MIGRAINE WITH AURA AND WITHOUT STATUS MIGRAINOSUS, NOT INTRACTABLE: Status: ACTIVE | Noted: 2023-10-31

## 2024-01-22 PROBLEM — N20.0 KIDNEY STONE ON RIGHT SIDE: Status: RESOLVED | Noted: 2023-06-15 | Resolved: 2024-01-22

## 2024-01-22 PROBLEM — K21.9 GASTROESOPHAGEAL REFLUX DISEASE WITHOUT ESOPHAGITIS: Status: ACTIVE | Noted: 2024-01-22

## 2024-01-22 PROBLEM — L02.91 ABSCESS: Status: RESOLVED | Noted: 2023-10-31 | Resolved: 2024-01-22

## 2024-01-22 PROCEDURE — 1036F TOBACCO NON-USER: CPT | Performed by: FAMILY MEDICINE

## 2024-01-22 PROCEDURE — 99214 OFFICE O/P EST MOD 30 MIN: CPT | Performed by: FAMILY MEDICINE

## 2024-01-22 PROCEDURE — 3008F BODY MASS INDEX DOCD: CPT | Performed by: FAMILY MEDICINE

## 2024-01-22 RX ORDER — CLINDAMYCIN AND BENZOYL PEROXIDE 10; 50 MG/G; MG/G
GEL TOPICAL 2 TIMES DAILY
Qty: 50 G | Refills: 1 | Status: SHIPPED | OUTPATIENT
Start: 2024-01-22 | End: 2024-03-07 | Stop reason: WASHOUT

## 2024-01-22 RX ORDER — DEXTROAMPHETAMINE SACCHARATE, AMPHETAMINE ASPARTATE, DEXTROAMPHETAMINE SULFATE AND AMPHETAMINE SULFATE 1.25; 1.25; 1.25; 1.25 MG/1; MG/1; MG/1; MG/1
5 TABLET ORAL DAILY
Qty: 30 TABLET | Refills: 0 | Status: SHIPPED | OUTPATIENT
Start: 2024-01-22 | End: 2024-03-04 | Stop reason: SDUPTHER

## 2024-01-22 RX ORDER — NEBIVOLOL 5 MG/1
5 TABLET ORAL DAILY
Start: 2024-01-22 | End: 2024-02-06 | Stop reason: SDUPTHER

## 2024-01-22 RX ORDER — MONTELUKAST SODIUM 10 MG/1
10 TABLET ORAL DAILY
Start: 2024-01-22 | End: 2024-03-07 | Stop reason: WASHOUT

## 2024-01-22 ASSESSMENT — ENCOUNTER SYMPTOMS
SORE THROAT: 0
DYSURIA: 0
CONSTIPATION: 0
LIGHT-HEADEDNESS: 0
COUGH: 0
FREQUENCY: 0
CHEST TIGHTNESS: 0
NERVOUS/ANXIOUS: 0
CONFUSION: 0
SINUS PAIN: 0
POLYDIPSIA: 0
SHORTNESS OF BREATH: 0
NUMBNESS: 0
HEADACHES: 0
DIAPHORESIS: 0
NAUSEA: 0
SINUS PRESSURE: 0
DIZZINESS: 0
WHEEZING: 0
FEVER: 0
HEMATURIA: 0
ABDOMINAL PAIN: 0
POLYPHAGIA: 0
DYSPHORIC MOOD: 0
ADENOPATHY: 0
UNEXPECTED WEIGHT CHANGE: 0
DIARRHEA: 0
VOMITING: 0
CHILLS: 0
PALPITATIONS: 0

## 2024-01-22 ASSESSMENT — PATIENT HEALTH QUESTIONNAIRE - PHQ9
SUM OF ALL RESPONSES TO PHQ9 QUESTIONS 1 AND 2: 0
1. LITTLE INTEREST OR PLEASURE IN DOING THINGS: NOT AT ALL
2. FEELING DOWN, DEPRESSED OR HOPELESS: NOT AT ALL

## 2024-01-22 NOTE — PROGRESS NOTES
Subjective   Patient ID: Roxanna Hargrove is a 28 y.o. female who presents for medication follow up (Acne concern, will need 5mg adderall refilled).    HPI     routine follow up. chronic issues as per assessment and plan.     Is doing well with adderall. Feels it is working well. Denies insomnia. Denies palpitations.    Interested in trying something for acne.     OARRS:  Rose Joiner MD on 1/22/2024  9:32 AM  I have personally reviewed the OARRS report for Roxanna Hargrove. I have considered the risks of abuse, dependence, addiction and diversion and I believe that it is clinically appropriate for Roxanna Hargrove to be prescribed this medication    Is the patient prescribed a combination of a benzodiazepine and opioid?  No    Last Urine Drug Screen / ordered today: No  Recent Results (from the past 8760 hour(s))   Amphetamine Confirm, Urine    Collection Time: 11/09/23 10:25 AM   Result Value Ref Range    Methamphetamine Quant, Ur <200 ng/mL    MDA, Urine <200 ng/mL    MDEA, Urine <200 ng/mL    Phentermine,Urine <200 ng/mL    Amphetamines,Urine >5000 ng/mL    MDMA, Urine <200 ng/mL   Drug Screen, Urine With Reflex to Confirmation    Collection Time: 11/09/23 10:25 AM   Result Value Ref Range    Amphetamine Screen, Urine Presumptive Positive (A) Presumptive Negative    Barbiturate Screen, Urine Presumptive Negative Presumptive Negative    Benzodiazepines Screen, Urine Presumptive Negative Presumptive Negative    Cannabinoid Screen, Urine Presumptive Negative Presumptive Negative    Cocaine Metabolite Screen, Urine Presumptive Negative Presumptive Negative    Fentanyl Screen, Urine Presumptive Negative Presumptive Negative    Opiate Screen, Urine Presumptive Negative Presumptive Negative    Oxycodone Screen, Urine Presumptive Negative Presumptive Negative    PCP Screen, Urine Presumptive Negative Presumptive Negative     Results are as expected.         Controlled Substance Agreement: Stimulants   Date of the Last  "Agreement: Nov. 2023  Reviewed Controlled Substance Agreement including but not limited to the benefits, risks, and alternatives to treatment with a Controlled Substance medication(s).    Stimulants:   What is the patient's goal of therapy? Improve focus and concentration  Is this being achieved with current treatment? Yes     Activities of Daily Living:   Is your overall impression that this patient is benefiting (symptom reduction outweighs side effects) from stimulant therapy? Yes     1. Physical Functioning: Better  2. Family Relationship: Better  3. Social Relationship: Better  4. Mood: Better  5. Sleep Patterns: Better  6. Overall Function: Better      Review of Systems   Constitutional:  Negative for chills, diaphoresis, fever and unexpected weight change.   HENT:  Negative for congestion, sinus pressure, sinus pain, sneezing and sore throat.    Respiratory:  Negative for cough, chest tightness, shortness of breath and wheezing.    Cardiovascular:  Negative for chest pain, palpitations and leg swelling.   Gastrointestinal:  Negative for abdominal pain, constipation, diarrhea, nausea and vomiting.   Endocrine: Negative for cold intolerance, heat intolerance, polydipsia, polyphagia and polyuria.   Genitourinary:  Negative for dysuria, frequency, hematuria and urgency.   Neurological:  Negative for dizziness, syncope, light-headedness, numbness and headaches.   Hematological:  Negative for adenopathy.   Psychiatric/Behavioral:  Negative for confusion and dysphoric mood. The patient is not nervous/anxious.        Objective   /81 (BP Location: Left arm, Patient Position: Sitting, BP Cuff Size: Adult)   Pulse 95   Resp 16   Ht 1.549 m (5' 1\")   Wt 70.8 kg (156 lb)   SpO2 98%   BMI 29.48 kg/m²     Physical Exam  Vitals and nursing note reviewed.   Constitutional:       General: She is not in acute distress.     Appearance: Normal appearance.   HENT:      Head: Normocephalic and atraumatic.      Nose: " Nose normal.   Eyes:      Extraocular Movements: Extraocular movements intact.      Conjunctiva/sclera: Conjunctivae normal.      Pupils: Pupils are equal, round, and reactive to light.   Cardiovascular:      Rate and Rhythm: Normal rate and regular rhythm.      Heart sounds: No murmur heard.     No friction rub. No gallop.   Pulmonary:      Effort: Pulmonary effort is normal.      Breath sounds: Normal breath sounds. No wheezing, rhonchi or rales.   Abdominal:      General: Bowel sounds are normal. There is no distension.      Palpations: Abdomen is soft.      Tenderness: There is no abdominal tenderness.   Musculoskeletal:         General: Normal range of motion.      Cervical back: Normal range of motion and neck supple.   Skin:     General: Skin is warm and dry.   Neurological:      General: No focal deficit present.      Mental Status: She is alert and oriented to person, place, and time.      Deep Tendon Reflexes: Reflexes normal.   Psychiatric:         Mood and Affect: Mood normal.         Behavior: Behavior normal.         Thought Content: Thought content normal.         Judgment: Judgment normal.         Assessment/Plan   Problem List Items Addressed This Visit             ICD-10-CM    Acne vulgaris L70.0     - trial benzaclin         Relevant Medications    clindamycin-benzoyl peroxide (Benzaclin) gel    Asthma, mild persistent J45.30     - controlled. Continue singulair          Relevant Medications    montelukast (Singulair) 10 mg tablet    Other Relevant Orders    Vitamin D 25-Hydroxy,Total (for eval of Vitamin D levels)    Vitamin B12    Hemoglobin A1C    CBC and Auto Differential    Comprehensive Metabolic Panel    Lipid Panel    TSH with reflex to Free T4 if abnormal    Attention deficit hyperactivity disorder (ADHD), combined type - Primary F90.2    Relevant Medications    amphetamine-dextroamphetamine (Adderall) 5 mg tablet    Other Relevant Orders    Vitamin D 25-Hydroxy,Total (for eval of Vitamin  D levels)    Vitamin B12    Hemoglobin A1C    CBC and Auto Differential    Comprehensive Metabolic Panel    Lipid Panel    TSH with reflex to Free T4 if abnormal    BMI 29.0-29.9,adult Z68.29     - Encouraged healthy lifestyle, including adequate exercise and high fiber, low fat and low carb diet.          Folate deficiency E53.8    Relevant Orders    Vitamin D 25-Hydroxy,Total (for eval of Vitamin D levels)    Vitamin B12    Hemoglobin A1C    CBC and Auto Differential    Comprehensive Metabolic Panel    Lipid Panel    TSH with reflex to Free T4 if abnormal    Folate    Generalized anxiety disorder F41.1    Relevant Orders    Vitamin D 25-Hydroxy,Total (for eval of Vitamin D levels)    Vitamin B12    Hemoglobin A1C    CBC and Auto Differential    Comprehensive Metabolic Panel    Lipid Panel    TSH with reflex to Free T4 if abnormal    Heart palpitations R00.2     - continue bystolic. Has seen cardiology in the past          Relevant Medications    nebivolol (Bystolic) 5 mg tablet    Other Relevant Orders    Vitamin D 25-Hydroxy,Total (for eval of Vitamin D levels)    Vitamin B12    Hemoglobin A1C    CBC and Auto Differential    Comprehensive Metabolic Panel    Lipid Panel    TSH with reflex to Free T4 if abnormal    Major depression F32.9    Relevant Orders    Vitamin D 25-Hydroxy,Total (for eval of Vitamin D levels)    Vitamin B12    Hemoglobin A1C    CBC and Auto Differential    Comprehensive Metabolic Panel    Lipid Panel    TSH with reflex to Free T4 if abnormal    Mixed hyperlipidemia E78.2     - Encouraged healthy lifestyle, including adequate exercise and high fiber, low fat and low carb diet.   - check labs          Relevant Orders    Vitamin D 25-Hydroxy,Total (for eval of Vitamin D levels)    Vitamin B12    Hemoglobin A1C    CBC and Auto Differential    Comprehensive Metabolic Panel    Lipid Panel    TSH with reflex to Free T4 if abnormal    Prediabetes R73.03     - Encouraged healthy lifestyle, including  adequate exercise and high fiber, low fat and low carb diet.   - check labs          Relevant Orders    Vitamin D 25-Hydroxy,Total (for eval of Vitamin D levels)    Vitamin B12    Hemoglobin A1C    CBC and Auto Differential    Comprehensive Metabolic Panel    Lipid Panel    TSH with reflex to Free T4 if abnormal    Seasonal allergic rhinitis due to pollen J30.1     - continue singulair          Relevant Medications    montelukast (Singulair) 10 mg tablet    Vitamin B12 deficiency E53.8     - check labs          Relevant Orders    Vitamin D 25-Hydroxy,Total (for eval of Vitamin D levels)    Vitamin B12    Hemoglobin A1C    CBC and Auto Differential    Comprehensive Metabolic Panel    Lipid Panel    TSH with reflex to Free T4 if abnormal

## 2024-01-22 NOTE — TELEPHONE ENCOUNTER
I need a refill on my adderall 5mg please, also can you send something for acne please?    Medication name:adderall     Strength:5mg    Directions:one in the afternoon    Pharmacy:[facility] [place]     Last OV:10/31/23    Next OV: 1/22/24        Chief complaint:    Symptoms:    Duration:    Treatments:    Patient requesting:    Did the patient have their covid vaccine?    Pharmacy:

## 2024-01-22 NOTE — PATIENT INSTRUCTIONS
Roxanna Hargrove ,    Thank you for coming in today. We at [facility] appreciate your trust in our care. If you have any questions or concerns about the care you received today, please do not hesitate to contact us at [phone].    The following instructions were discussed today:

## 2024-01-22 NOTE — ASSESSMENT & PLAN NOTE
- Encouraged healthy lifestyle, including adequate exercise and high fiber, low fat and low carb diet.   - check labs

## 2024-01-30 DIAGNOSIS — F90.2 ATTENTION DEFICIT HYPERACTIVITY DISORDER (ADHD), COMBINED TYPE: ICD-10-CM

## 2024-01-30 RX ORDER — DEXTROAMPHETAMINE SACCHARATE, AMPHETAMINE ASPARTATE MONOHYDRATE, DEXTROAMPHETAMINE SULFATE AND AMPHETAMINE SULFATE 5; 5; 5; 5 MG/1; MG/1; MG/1; MG/1
20 CAPSULE, EXTENDED RELEASE ORAL EVERY MORNING
Qty: 30 CAPSULE | Refills: 0 | Status: SHIPPED | OUTPATIENT
Start: 2024-01-30 | End: 2024-03-04 | Stop reason: SDUPTHER

## 2024-01-30 NOTE — TELEPHONE ENCOUNTER
Last appointment: 1/22/2024  Next appointment: Visit date not found     Eprescribed. I have personally reviewed the OARRS report.  This report is scanned into the electronic medical record.  I have considered the risks of abuse, dependence, addiction and diversion.  I believe that it is clinically appropriate to prescribe this medication.

## 2024-02-06 ENCOUNTER — OFFICE VISIT (OUTPATIENT)
Dept: PRIMARY CARE | Facility: CLINIC | Age: 29
End: 2024-02-06
Payer: COMMERCIAL

## 2024-02-06 ENCOUNTER — APPOINTMENT (OUTPATIENT)
Dept: PRIMARY CARE | Facility: CLINIC | Age: 29
End: 2024-02-06
Payer: COMMERCIAL

## 2024-02-06 VITALS
HEIGHT: 61 IN | OXYGEN SATURATION: 96 % | BODY MASS INDEX: 28.89 KG/M2 | DIASTOLIC BLOOD PRESSURE: 64 MMHG | SYSTOLIC BLOOD PRESSURE: 106 MMHG | HEART RATE: 94 BPM | TEMPERATURE: 97.3 F | WEIGHT: 153 LBS

## 2024-02-06 DIAGNOSIS — R00.2 HEART PALPITATIONS: ICD-10-CM

## 2024-02-06 DIAGNOSIS — M99.08 SEGMENTAL AND SOMATIC DYSFUNCTION OF RIB CAGE: ICD-10-CM

## 2024-02-06 DIAGNOSIS — M99.02 SEGMENTAL AND SOMATIC DYSFUNCTION OF THORACIC REGION: ICD-10-CM

## 2024-02-06 DIAGNOSIS — M99.00 SEGMENTAL AND SOMATIC DYSFUNCTION OF HEAD REGION: ICD-10-CM

## 2024-02-06 DIAGNOSIS — M54.2 DORSALGIA OF CERVICOTHORACIC REGION: Primary | ICD-10-CM

## 2024-02-06 DIAGNOSIS — M99.07 SEGMENTAL AND SOMATIC DYSFUNCTION OF UPPER EXTREMITY: ICD-10-CM

## 2024-02-06 DIAGNOSIS — M54.6 DORSALGIA OF CERVICOTHORACIC REGION: Primary | ICD-10-CM

## 2024-02-06 DIAGNOSIS — M99.01 SEGMENTAL AND SOMATIC DYSFUNCTION OF CERVICAL REGION: ICD-10-CM

## 2024-02-06 PROCEDURE — 3008F BODY MASS INDEX DOCD: CPT | Performed by: FAMILY MEDICINE

## 2024-02-06 PROCEDURE — 1036F TOBACCO NON-USER: CPT | Performed by: FAMILY MEDICINE

## 2024-02-06 PROCEDURE — 99213 OFFICE O/P EST LOW 20 MIN: CPT | Performed by: FAMILY MEDICINE

## 2024-02-06 PROCEDURE — 98927 OSTEOPATH MANJ 5-6 REGIONS: CPT | Performed by: FAMILY MEDICINE

## 2024-02-06 RX ORDER — NEBIVOLOL 5 MG/1
5 TABLET ORAL DAILY
Qty: 90 TABLET | Refills: 1 | Status: SHIPPED | OUTPATIENT
Start: 2024-02-06 | End: 2024-03-25 | Stop reason: SDUPTHER

## 2024-02-06 ASSESSMENT — PATIENT HEALTH QUESTIONNAIRE - PHQ9
1. LITTLE INTEREST OR PLEASURE IN DOING THINGS: NOT AT ALL
2. FEELING DOWN, DEPRESSED OR HOPELESS: NOT AT ALL
SUM OF ALL RESPONSES TO PHQ9 QUESTIONS 1 AND 2: 0

## 2024-02-06 NOTE — PROGRESS NOTES
Subjective   Patient ID: Roxanna Hargrove is a 28 y.o. female who presents for upper back pain  HPI  Patient is having problems with neck and back pain.  The back pain is occurring in the upper back.  Is a lot of tension.  Denies any pain acute tingling down the extremities or weakness in the extremities.    Review of Systems    Objective   Physical Exam  General: Patient is alert and orient x3 and appears in no acute distress.  Some pain distress.    Neck: Decreased range of motion in all planes    Heart: Regular rate and rhythm no murmurs clicks or gallops    Lungs: Clear to auscultation bilaterally without rhonchi rales or wheezing      Musculoskeletal: Strength was grossly intact.  Deep tendon reflexes intact.  Sensation intact.  Decreased range of motion    Osteopathic structural:  In the head region there is increased suboccipital tension, masseter muscle spasms on the left, jaw deviates to the left  In the cervical region C2 extended rotated right side bent left  In the rib region first rib on the left was tender to palpation resisted exhalation  In the upper extremity  right upper extremity was protracted inferior tension of pectoralis minor  In the thoracic region  T2 was extended rotated right side bent right, T7 flexed rotated right side bent right        Assessment/Plan   Problem List Items Addressed This Visit    None  Visit Diagnoses       Dorsalgia of cervicothoracic region    -  Primary    Segmental and somatic dysfunction of cervical region        Segmental and somatic dysfunction of head region        Segmental and somatic dysfunction of rib cage        Segmental and somatic dysfunction of thoracic region        Segmental and somatic dysfunction of upper extremity            Osteopathic manipulative therapy was used  In the head region as suboccipital release  In the cervical region as functional positional release  In the upper extremity as muscle energy  In the rib region as functional positional  release  In the Thoracics as high velocity low amplitude thrust and muscle energy    The patient was given exercises for trapezius, pectoralis minor, suboccipital and masseter muscles.    Patient tolerated the procedure well and noticed improvement after the treatment.

## 2024-02-14 ENCOUNTER — OFFICE VISIT (OUTPATIENT)
Dept: PRIMARY CARE | Facility: CLINIC | Age: 29
End: 2024-02-14
Payer: COMMERCIAL

## 2024-02-14 DIAGNOSIS — M54.6 DORSALGIA OF CERVICOTHORACIC REGION: Primary | ICD-10-CM

## 2024-02-14 DIAGNOSIS — M99.07 SEGMENTAL AND SOMATIC DYSFUNCTION OF UPPER EXTREMITY: ICD-10-CM

## 2024-02-14 DIAGNOSIS — M99.00 SEGMENTAL AND SOMATIC DYSFUNCTION OF HEAD REGION: ICD-10-CM

## 2024-02-14 DIAGNOSIS — M99.01 SEGMENTAL AND SOMATIC DYSFUNCTION OF CERVICAL REGION: ICD-10-CM

## 2024-02-14 DIAGNOSIS — M54.2 DORSALGIA OF CERVICOTHORACIC REGION: Primary | ICD-10-CM

## 2024-02-14 DIAGNOSIS — M99.02 SEGMENTAL AND SOMATIC DYSFUNCTION OF THORACIC REGION: ICD-10-CM

## 2024-02-14 DIAGNOSIS — M99.08 SEGMENTAL AND SOMATIC DYSFUNCTION OF RIB CAGE: ICD-10-CM

## 2024-02-14 PROCEDURE — 3008F BODY MASS INDEX DOCD: CPT | Performed by: FAMILY MEDICINE

## 2024-02-14 PROCEDURE — 98927 OSTEOPATH MANJ 5-6 REGIONS: CPT | Performed by: FAMILY MEDICINE

## 2024-02-14 PROCEDURE — 1036F TOBACCO NON-USER: CPT | Performed by: FAMILY MEDICINE

## 2024-02-14 PROCEDURE — 99213 OFFICE O/P EST LOW 20 MIN: CPT | Performed by: FAMILY MEDICINE

## 2024-03-04 DIAGNOSIS — F90.2 ATTENTION DEFICIT HYPERACTIVITY DISORDER (ADHD), COMBINED TYPE: ICD-10-CM

## 2024-03-04 RX ORDER — DEXTROAMPHETAMINE SACCHARATE, AMPHETAMINE ASPARTATE MONOHYDRATE, DEXTROAMPHETAMINE SULFATE AND AMPHETAMINE SULFATE 5; 5; 5; 5 MG/1; MG/1; MG/1; MG/1
20 CAPSULE, EXTENDED RELEASE ORAL EVERY MORNING
Qty: 30 CAPSULE | Refills: 0 | Status: SHIPPED | OUTPATIENT
Start: 2024-03-04 | End: 2024-04-09 | Stop reason: SDUPTHER

## 2024-03-04 RX ORDER — DEXTROAMPHETAMINE SACCHARATE, AMPHETAMINE ASPARTATE, DEXTROAMPHETAMINE SULFATE AND AMPHETAMINE SULFATE 1.25; 1.25; 1.25; 1.25 MG/1; MG/1; MG/1; MG/1
5 TABLET ORAL DAILY
Qty: 30 TABLET | Refills: 0 | Status: SHIPPED | OUTPATIENT
Start: 2024-03-04 | End: 2024-04-18

## 2024-03-04 NOTE — TELEPHONE ENCOUNTER
Needs appointment  for May 2024.     Last appointment: 2/14/2024  Next appointment: Visit date not found     Eprescribed. I have personally reviewed the OARRS report.  This report is scanned into the electronic medical record.  I have considered the risks of abuse, dependence, addiction and diversion.  I believe that it is clinically appropriate to prescribe this medication.

## 2024-03-05 ENCOUNTER — TELEPHONE (OUTPATIENT)
Dept: PRIMARY CARE | Facility: CLINIC | Age: 29
End: 2024-03-05
Payer: COMMERCIAL

## 2024-03-05 DIAGNOSIS — J01.90 ACUTE SINUSITIS, RECURRENCE NOT SPECIFIED, UNSPECIFIED LOCATION: Primary | ICD-10-CM

## 2024-03-05 DIAGNOSIS — R11.0 NAUSEA: ICD-10-CM

## 2024-03-05 NOTE — TELEPHONE ENCOUNTER
Can you send antibiotic for sinus infection, symptoms for almost 3 weeks, sinus/facial pressure, headache, dark green mucus.

## 2024-03-06 RX ORDER — ONDANSETRON 2 MG/ML
4 INJECTION INTRAMUSCULAR; INTRAVENOUS ONCE
Status: DISCONTINUED | OUTPATIENT
Start: 2024-03-06 | End: 2024-03-06

## 2024-03-06 RX ORDER — AZITHROMYCIN 250 MG/1
TABLET, FILM COATED ORAL
Qty: 6 TABLET | Refills: 0 | Status: SHIPPED | OUTPATIENT
Start: 2024-03-06 | End: 2024-03-10

## 2024-03-07 ENCOUNTER — OFFICE VISIT (OUTPATIENT)
Dept: PRIMARY CARE | Facility: CLINIC | Age: 29
End: 2024-03-07
Payer: COMMERCIAL

## 2024-03-07 DIAGNOSIS — M54.50 CHRONIC BILATERAL LOW BACK PAIN WITHOUT SCIATICA: Primary | ICD-10-CM

## 2024-03-07 DIAGNOSIS — M54.6 DORSALGIA OF CERVICOTHORACIC REGION: ICD-10-CM

## 2024-03-07 DIAGNOSIS — M99.02 SEGMENTAL AND SOMATIC DYSFUNCTION OF THORACIC REGION: ICD-10-CM

## 2024-03-07 DIAGNOSIS — G89.29 CHRONIC BILATERAL LOW BACK PAIN WITHOUT SCIATICA: Primary | ICD-10-CM

## 2024-03-07 DIAGNOSIS — M99.01 SEGMENTAL AND SOMATIC DYSFUNCTION OF CERVICAL REGION: ICD-10-CM

## 2024-03-07 DIAGNOSIS — M99.05 SEGMENTAL AND SOMATIC DYSFUNCTION OF PELVIC REGION: ICD-10-CM

## 2024-03-07 DIAGNOSIS — M99.07 SEGMENTAL AND SOMATIC DYSFUNCTION OF UPPER EXTREMITY: ICD-10-CM

## 2024-03-07 DIAGNOSIS — M99.08 SEGMENTAL AND SOMATIC DYSFUNCTION OF RIB CAGE: ICD-10-CM

## 2024-03-07 DIAGNOSIS — M99.00 SEGMENTAL AND SOMATIC DYSFUNCTION OF HEAD REGION: ICD-10-CM

## 2024-03-07 DIAGNOSIS — M54.2 DORSALGIA OF CERVICOTHORACIC REGION: ICD-10-CM

## 2024-03-07 DIAGNOSIS — M99.03 SEGMENTAL AND SOMATIC DYSFUNCTION OF LUMBAR REGION: ICD-10-CM

## 2024-03-07 LAB — HEMOGLOBIN A1C/HEMOGLOBIN TOTAL IN BLOOD EXTERNAL: 5.5 %

## 2024-03-07 PROCEDURE — 1036F TOBACCO NON-USER: CPT | Performed by: FAMILY MEDICINE

## 2024-03-07 PROCEDURE — 3008F BODY MASS INDEX DOCD: CPT | Performed by: FAMILY MEDICINE

## 2024-03-07 PROCEDURE — 99213 OFFICE O/P EST LOW 20 MIN: CPT | Performed by: FAMILY MEDICINE

## 2024-03-07 PROCEDURE — 98928 OSTEOPATH MANJ 7-8 REGIONS: CPT | Performed by: FAMILY MEDICINE

## 2024-03-07 ASSESSMENT — PATIENT HEALTH QUESTIONNAIRE - PHQ9
2. FEELING DOWN, DEPRESSED OR HOPELESS: NOT AT ALL
1. LITTLE INTEREST OR PLEASURE IN DOING THINGS: NOT AT ALL
SUM OF ALL RESPONSES TO PHQ9 QUESTIONS 1 AND 2: 0

## 2024-03-07 NOTE — PROGRESS NOTES
Subjective   Patient ID: Roxanna Hargrove is a 28 y.o. female who presents for upper back pain  HPI  Patient is having problems with neck and back pain.  The back pain is occurring in the upper back.  Is a lot of tension.  Denies any pain acute tingling down the extremities or weakness in the extremities.    Review of Systems    Objective   Physical Exam  General: Patient is alert and orient x3 and appears in no acute distress.  Some pain distress.    Neck: Decreased range of motion in all planes    Heart: Regular rate and rhythm no murmurs clicks or gallops    Lungs: Clear to auscultation bilaterally without rhonchi rales or wheezing      Musculoskeletal: Strength was grossly intact.  Deep tendon reflexes intact.  Sensation intact.  Decreased range of motion    Osteopathic structural:  In the head region there is increased suboccipital tension, masseter muscle spasms on the left, jaw deviates to the left  In the cervical region C2 extended rotated right side bent left  In the rib region first rib on the left was tender to palpation resisted exhalation  In the upper extremity  right upper extremity was protracted inferior tension of pectoralis minor  In the thoracic region  T2 was extended rotated right side bent right, T7 flexed rotated right side bent right  In the lumbar region L5 was extended rotated right side bent right  In the pelvis positive pelvic compression test on the right, ASIS inferior and right, PSIS superior on the right, inferior pubic ramus on the right, myofascial tension throughout the left lower extremity resisting internal rotation        Assessment/Plan   Problem List Items Addressed This Visit       Chronic bilateral low back pain without sciatica - Primary     Other Visit Diagnoses       Dorsalgia of cervicothoracic region        Segmental and somatic dysfunction of cervical region        Segmental and somatic dysfunction of head region        Segmental and somatic dysfunction of rib cage         Segmental and somatic dysfunction of thoracic region        Segmental and somatic dysfunction of upper extremity        Segmental and somatic dysfunction of lumbar region        Segmental and somatic dysfunction of pelvic region            Osteopathic manipulative therapy was used  In the head region as suboccipital release  In the cervical region as functional positional release  In the upper extremity as muscle energy  In the rib region as functional positional release  In the Thoracics as high velocity low amplitude thrust and muscle energy  In the pelvic region as muscle energy  In the lumbar region as lumbar will    The patient was given exercises for trapezius, pectoralis minor, suboccipital and masseter muscles.    Patient tolerated the procedure well and noticed improvement after the treatment.

## 2024-03-21 PROBLEM — E55.9 VITAMIN D DEFICIENCY: Status: ACTIVE | Noted: 2024-03-21

## 2024-03-21 PROBLEM — R14.0 ABDOMINAL BLOATING: Status: ACTIVE | Noted: 2024-03-21

## 2024-03-21 PROBLEM — O32.9XX0: Status: ACTIVE | Noted: 2024-03-21

## 2024-03-21 PROBLEM — B80 ENTEROBIASIS: Status: ACTIVE | Noted: 2024-03-21

## 2024-03-21 PROBLEM — R68.89 INTOLERANT OF COLD: Status: ACTIVE | Noted: 2024-03-21

## 2024-03-21 PROBLEM — T14.8XXA CONTUSION: Status: ACTIVE | Noted: 2024-03-21

## 2024-03-21 PROBLEM — E53.8 FOLIC ACID DEFICIENCY: Status: ACTIVE | Noted: 2023-06-15

## 2024-03-21 PROBLEM — O20.0 THREATENED ABORTION (HHS-HCC): Status: ACTIVE | Noted: 2024-03-21

## 2024-03-21 PROBLEM — K59.09 CHRONIC CONSTIPATION: Status: ACTIVE | Noted: 2024-03-21

## 2024-03-21 PROBLEM — J45.909 ASTHMA (HHS-HCC): Status: ACTIVE | Noted: 2023-06-15

## 2024-03-21 PROBLEM — R73.02 IMPAIRED GLUCOSE TOLERANCE: Status: ACTIVE | Noted: 2023-10-31

## 2024-03-21 PROBLEM — R51.9 HEADACHE: Status: ACTIVE | Noted: 2024-03-21

## 2024-03-21 PROBLEM — F17.200 NICOTINE DEPENDENCE: Status: ACTIVE | Noted: 2024-03-21

## 2024-03-21 PROBLEM — E66.9 OBESITY WITH BODY MASS INDEX 30 OR GREATER: Status: ACTIVE | Noted: 2023-10-31

## 2024-03-21 PROBLEM — R11.0 NAUSEA: Status: ACTIVE | Noted: 2024-03-21

## 2024-03-21 RX ORDER — BENZONATATE 200 MG/1
200 CAPSULE ORAL
COMMUNITY
Start: 2020-10-19 | End: 2024-03-25 | Stop reason: ALTCHOICE

## 2024-03-21 RX ORDER — VILAZODONE HYDROCHLORIDE 20 MG/1
20 TABLET ORAL
COMMUNITY
Start: 2022-06-27 | End: 2024-03-25 | Stop reason: ALTCHOICE

## 2024-03-21 RX ORDER — PAROXETINE HYDROCHLORIDE 20 MG/1
20 TABLET, FILM COATED ORAL
COMMUNITY
Start: 2021-11-04 | End: 2024-03-25 | Stop reason: ALTCHOICE

## 2024-03-21 RX ORDER — PROPRANOLOL HYDROCHLORIDE 60 MG/1
60 CAPSULE, EXTENDED RELEASE ORAL
COMMUNITY
Start: 2020-09-18 | End: 2024-03-25 | Stop reason: ALTCHOICE

## 2024-03-21 RX ORDER — ARIPIPRAZOLE 2 MG/1
2 TABLET ORAL
COMMUNITY
Start: 2022-05-31 | End: 2024-03-25 | Stop reason: ALTCHOICE

## 2024-03-21 RX ORDER — LEVALBUTEROL TARTRATE 45 UG/1
AEROSOL, METERED ORAL
COMMUNITY
Start: 2020-02-04 | End: 2024-03-25 | Stop reason: ALTCHOICE

## 2024-03-21 RX ORDER — CYANOCOBALAMIN (VITAMIN B-12) 1000 MCG
TABLET, EXTENDED RELEASE ORAL
COMMUNITY
Start: 2022-03-01 | End: 2024-03-25 | Stop reason: ALTCHOICE

## 2024-03-21 RX ORDER — POLYETHYLENE GLYCOL 3350 17 G/17G
17 POWDER, FOR SOLUTION ORAL
COMMUNITY
Start: 2021-06-03 | End: 2024-03-25 | Stop reason: ALTCHOICE

## 2024-03-21 RX ORDER — ATENOLOL 25 MG/1
25 TABLET ORAL
COMMUNITY
Start: 2020-07-07 | End: 2024-03-25 | Stop reason: ALTCHOICE

## 2024-03-21 RX ORDER — CETIRIZINE HYDROCHLORIDE 10 MG/1
10 TABLET ORAL
COMMUNITY
Start: 2021-06-03 | End: 2024-03-25 | Stop reason: ALTCHOICE

## 2024-03-21 RX ORDER — NORGESTIMATE AND ETHINYL ESTRADIOL 7DAYSX3 28
1 KIT ORAL DAILY
COMMUNITY
Start: 2023-01-17 | End: 2024-03-25 | Stop reason: ALTCHOICE

## 2024-03-21 RX ORDER — VITAMIN A, ASCORBIC ACID, VITAMIN D, .ALPHA.-TOCOPHEROL, THIAMINE MONONITRATE, RIBOFLAVIN, NIACIN, PYRIDOXINE HYDROCHLORIDE, FOLIC ACID, CYANOCOBALAMIN, CALCIUM, AND IRON 6000; 60; 400; 10; 1.1; 1.8; 15; 15; 1; 5; 125; 27 [IU]/1; MG/1; [IU]/1; [IU]/1; MG/1; MG/1; MG/1; MG/1; MG/1; UG/1; MG/1; MG/1
CAPSULE ORAL
COMMUNITY
Start: 2021-07-13 | End: 2024-03-25 | Stop reason: ALTCHOICE

## 2024-03-21 RX ORDER — ESCITALOPRAM OXALATE 10 MG/1
10 TABLET ORAL
COMMUNITY
Start: 2021-07-13 | End: 2024-03-25 | Stop reason: ALTCHOICE

## 2024-03-21 RX ORDER — ONDANSETRON 4 MG/1
4 TABLET, ORALLY DISINTEGRATING ORAL
COMMUNITY
Start: 2022-02-20 | End: 2024-03-25 | Stop reason: ALTCHOICE

## 2024-03-21 RX ORDER — SERTRALINE HYDROCHLORIDE 25 MG/1
25 TABLET, FILM COATED ORAL
COMMUNITY
Start: 2020-01-21 | End: 2024-03-25 | Stop reason: ALTCHOICE

## 2024-03-21 RX ORDER — CLONAZEPAM 0.5 MG/1
0.5 TABLET ORAL EVERY 12 HOURS
COMMUNITY
Start: 2020-05-14 | End: 2024-03-25 | Stop reason: ALTCHOICE

## 2024-03-25 DIAGNOSIS — R00.2 HEART PALPITATIONS: ICD-10-CM

## 2024-03-27 VITALS
WEIGHT: 151 LBS | BODY MASS INDEX: 28.51 KG/M2 | SYSTOLIC BLOOD PRESSURE: 122 MMHG | TEMPERATURE: 97.1 F | DIASTOLIC BLOOD PRESSURE: 70 MMHG | HEIGHT: 61 IN | HEART RATE: 107 BPM | OXYGEN SATURATION: 95 %

## 2024-03-27 RX ORDER — NEBIVOLOL 5 MG/1
5 TABLET ORAL DAILY
Qty: 90 TABLET | Refills: 1 | Status: SHIPPED | OUTPATIENT
Start: 2024-03-27 | End: 2024-09-23

## 2024-04-01 ENCOUNTER — TELEPHONE (OUTPATIENT)
Dept: PRIMARY CARE | Facility: CLINIC | Age: 29
End: 2024-04-01
Payer: COMMERCIAL

## 2024-04-01 DIAGNOSIS — B37.31 YEAST VAGINITIS: Primary | ICD-10-CM

## 2024-04-01 RX ORDER — FLUCONAZOLE 150 MG/1
TABLET ORAL
Qty: 2 TABLET | Refills: 0 | Status: SHIPPED | OUTPATIENT
Start: 2024-04-01 | End: 2024-04-19

## 2024-04-04 ENCOUNTER — APPOINTMENT (OUTPATIENT)
Dept: PRIMARY CARE | Facility: CLINIC | Age: 29
End: 2024-04-04
Payer: COMMERCIAL

## 2024-04-08 ENCOUNTER — PROCEDURE VISIT (OUTPATIENT)
Dept: PRIMARY CARE | Facility: CLINIC | Age: 29
End: 2024-04-08
Payer: COMMERCIAL

## 2024-04-08 VITALS
OXYGEN SATURATION: 98 % | BODY MASS INDEX: 27.21 KG/M2 | DIASTOLIC BLOOD PRESSURE: 74 MMHG | WEIGHT: 144 LBS | TEMPERATURE: 97.7 F | HEART RATE: 94 BPM | SYSTOLIC BLOOD PRESSURE: 112 MMHG

## 2024-04-08 DIAGNOSIS — M99.07 SEGMENTAL AND SOMATIC DYSFUNCTION OF UPPER EXTREMITY: ICD-10-CM

## 2024-04-08 DIAGNOSIS — M54.6 DORSALGIA OF CERVICOTHORACIC REGION: Primary | ICD-10-CM

## 2024-04-08 DIAGNOSIS — M99.08 SEGMENTAL AND SOMATIC DYSFUNCTION OF RIB CAGE: ICD-10-CM

## 2024-04-08 DIAGNOSIS — M99.01 SEGMENTAL AND SOMATIC DYSFUNCTION OF CERVICAL REGION: ICD-10-CM

## 2024-04-08 DIAGNOSIS — M99.00 SEGMENTAL AND SOMATIC DYSFUNCTION OF HEAD REGION: ICD-10-CM

## 2024-04-08 DIAGNOSIS — M54.2 DORSALGIA OF CERVICOTHORACIC REGION: Primary | ICD-10-CM

## 2024-04-08 DIAGNOSIS — M99.02 SEGMENTAL AND SOMATIC DYSFUNCTION OF THORACIC REGION: ICD-10-CM

## 2024-04-08 PROCEDURE — 98927 OSTEOPATH MANJ 5-6 REGIONS: CPT | Performed by: FAMILY MEDICINE

## 2024-04-08 PROCEDURE — 99213 OFFICE O/P EST LOW 20 MIN: CPT | Performed by: FAMILY MEDICINE

## 2024-04-08 NOTE — PROGRESS NOTES
Subjective   Patient ID: Roxanna Hargrove is a 29 y.o. female who presents for upper back pain  HPI  Patient is having problems with neck and back pain.  The back pain is occurring in the upper back.  Is a lot of tension.  Denies any pain acute tingling down the extremities or weakness in the extremities.    Review of Systems    Objective   Physical Exam  General: Patient is alert and orient x3 and appears in no acute distress.  Some pain distress.    Neck: Decreased range of motion in all planes    Heart: Regular rate and rhythm no murmurs clicks or gallops    Lungs: Clear to auscultation bilaterally without rhonchi rales or wheezing      Musculoskeletal: Strength was grossly intact.  Deep tendon reflexes intact.  Sensation intact.  Decreased range of motion    Osteopathic structural:  In the head region there is increased suboccipital tension, masseter muscle spasms on the left, jaw deviates to the left  In the cervical region C2 extended rotated right side bent left  In the rib region first rib on the left was tender to palpation resisted exhalation  In the upper extremity  right upper extremity was protracted inferior tension of pectoralis minor  In the thoracic region  T2 was extended rotated right side bent right, T7 flexed rotated right side bent right        Assessment/Plan   Problem List Items Addressed This Visit    None  Visit Diagnoses       Dorsalgia of cervicothoracic region    -  Primary    Segmental and somatic dysfunction of cervical region        Segmental and somatic dysfunction of head region        Segmental and somatic dysfunction of rib cage        Segmental and somatic dysfunction of thoracic region        Segmental and somatic dysfunction of upper extremity            Osteopathic manipulative therapy was used  In the head region as suboccipital release  In the cervical region as functional positional release  In the upper extremity as muscle energy  In the rib region as functional positional  release  In the Thoracics as high velocity low amplitude thrust and muscle energy    The patient was given exercises for trapezius, pectoralis minor, suboccipital and masseter muscles.    Patient tolerated the procedure well and noticed improvement after the treatment.

## 2024-04-09 ENCOUNTER — TELEPHONE (OUTPATIENT)
Dept: PRIMARY CARE | Facility: CLINIC | Age: 29
End: 2024-04-09
Payer: COMMERCIAL

## 2024-04-09 DIAGNOSIS — F90.2 ATTENTION DEFICIT HYPERACTIVITY DISORDER (ADHD), COMBINED TYPE: ICD-10-CM

## 2024-04-09 DIAGNOSIS — K59.04 CHRONIC IDIOPATHIC CONSTIPATION: Primary | ICD-10-CM

## 2024-04-09 RX ORDER — PLECANATIDE 3 MG/1
3 TABLET ORAL DAILY
COMMUNITY
End: 2024-04-09 | Stop reason: SDUPTHER

## 2024-04-09 RX ORDER — PLECANATIDE 3 MG/1
3 TABLET ORAL DAILY
Qty: 90 TABLET | Refills: 1 | Status: SHIPPED | OUTPATIENT
Start: 2024-04-09 | End: 2024-04-19

## 2024-04-09 RX ORDER — DEXTROAMPHETAMINE SACCHARATE, AMPHETAMINE ASPARTATE MONOHYDRATE, DEXTROAMPHETAMINE SULFATE AND AMPHETAMINE SULFATE 5; 5; 5; 5 MG/1; MG/1; MG/1; MG/1
20 CAPSULE, EXTENDED RELEASE ORAL EVERY MORNING
Qty: 30 CAPSULE | Refills: 0 | Status: SHIPPED | OUTPATIENT
Start: 2024-04-09 | End: 2024-05-09

## 2024-04-09 NOTE — TELEPHONE ENCOUNTER
Would you be able to send a different medication in for pulse? Currently on bystolic 5mg and it is not helping as much anymore, can make an appointment if needed, tried metoprolol and atenolol in the past but had bad side effects.

## 2024-04-09 NOTE — TELEPHONE ENCOUNTER
Appointment needed. May need to decrease the adderall.     Last appointment: 4/8/2024  Next appointment: Visit date not found     Eprescribed. I have personally reviewed the OARRS report.  This report is scanned into the electronic medical record.  I have considered the risks of abuse, dependence, addiction and diversion.  I believe that it is clinically appropriate to prescribe this medication.

## 2024-04-18 ENCOUNTER — PROCEDURE VISIT (OUTPATIENT)
Dept: PRIMARY CARE | Facility: CLINIC | Age: 29
End: 2024-04-18
Payer: COMMERCIAL

## 2024-04-18 VITALS
HEIGHT: 61 IN | TEMPERATURE: 97.3 F | SYSTOLIC BLOOD PRESSURE: 108 MMHG | HEART RATE: 92 BPM | WEIGHT: 144 LBS | OXYGEN SATURATION: 99 % | BODY MASS INDEX: 27.19 KG/M2 | DIASTOLIC BLOOD PRESSURE: 58 MMHG

## 2024-04-18 DIAGNOSIS — G44.209 TENSION HEADACHE: Primary | ICD-10-CM

## 2024-04-18 DIAGNOSIS — M99.00 SEGMENTAL AND SOMATIC DYSFUNCTION OF HEAD REGION: ICD-10-CM

## 2024-04-18 DIAGNOSIS — M99.01 SEGMENTAL AND SOMATIC DYSFUNCTION OF CERVICAL REGION: ICD-10-CM

## 2024-04-18 DIAGNOSIS — M99.08 SEGMENTAL AND SOMATIC DYSFUNCTION OF RIB CAGE: ICD-10-CM

## 2024-04-18 DIAGNOSIS — M99.02 SEGMENTAL AND SOMATIC DYSFUNCTION OF THORACIC REGION: ICD-10-CM

## 2024-04-18 DIAGNOSIS — M99.07 SEGMENTAL AND SOMATIC DYSFUNCTION OF UPPER EXTREMITY: ICD-10-CM

## 2024-04-18 PROCEDURE — 99213 OFFICE O/P EST LOW 20 MIN: CPT | Performed by: FAMILY MEDICINE

## 2024-04-18 NOTE — PROGRESS NOTES
Subjective   Patient ID: Roxanna Hargrove is a 29 y.o. female who presents for upper back pain  HPI  Headaches.  Daily.  1 month.  Frontal in nature.  Excedrin daily.  This relieves.  Drink medium coffee in the AM.  Sometimes nausea.  No issues with lights and sound.  Relief with OMT  Review of Systems    Objective   Physical Exam  General: Patient is alert and orient x3 and appears in no acute distress.  Some pain distress.    Neck: Decreased range of motion in all planes    Heart: Regular rate and rhythm no murmurs clicks or gallops    Lungs: Clear to auscultation bilaterally without rhonchi rales or wheezing      Musculoskeletal: Strength was grossly intact.  Deep tendon reflexes intact.  Sensation intact.  Decreased range of motion    Osteopathic structural:  In the head region there is increased suboccipital tension  In the cervical region C2 extended rotated right side bent left  In the rib region first rib on the left was tender to palpation resisted exhalation  In the upper extremity  right upper extremity was protracted inferior tension of pectoralis minor  In the thoracic region  T2 was extended rotated right side bent right, T7 flexed rotated right side bent right        Assessment/Plan   Problem List Items Addressed This Visit    None  Visit Diagnoses       Tension headache    -  Primary    Segmental and somatic dysfunction of cervical region        Segmental and somatic dysfunction of head region        Segmental and somatic dysfunction of rib cage        Segmental and somatic dysfunction of thoracic region        Segmental and somatic dysfunction of upper extremity              Osteopathic manipulative therapy was used  In the head region as suboccipital release  In the cervical region as functional positional release  In the upper extremity as muscle energy  In the rib region as functional positional release  In the Thoracics as high velocity low amplitude thrust and muscle energy      Patient tolerated  the procedure well and noticed improvement after the treatment.

## 2024-04-19 ENCOUNTER — APPOINTMENT (OUTPATIENT)
Dept: CARDIOLOGY | Facility: CLINIC | Age: 29
End: 2024-04-19
Payer: COMMERCIAL

## 2024-05-08 ENCOUNTER — OFFICE VISIT (OUTPATIENT)
Dept: PRIMARY CARE | Facility: CLINIC | Age: 29
End: 2024-05-08
Payer: COMMERCIAL

## 2024-05-08 DIAGNOSIS — M54.2 DORSALGIA OF CERVICOTHORACIC REGION: ICD-10-CM

## 2024-05-08 DIAGNOSIS — R07.81 RIB PAIN ON RIGHT SIDE: Primary | ICD-10-CM

## 2024-05-08 DIAGNOSIS — M99.07 SEGMENTAL AND SOMATIC DYSFUNCTION OF UPPER EXTREMITY: ICD-10-CM

## 2024-05-08 DIAGNOSIS — M99.01 SEGMENTAL AND SOMATIC DYSFUNCTION OF CERVICAL REGION: ICD-10-CM

## 2024-05-08 DIAGNOSIS — M99.08 SEGMENTAL AND SOMATIC DYSFUNCTION OF RIB CAGE: ICD-10-CM

## 2024-05-08 DIAGNOSIS — M99.02 SEGMENTAL AND SOMATIC DYSFUNCTION OF THORACIC REGION: ICD-10-CM

## 2024-05-08 DIAGNOSIS — M54.6 DORSALGIA OF CERVICOTHORACIC REGION: ICD-10-CM

## 2024-05-08 DIAGNOSIS — M99.00 SEGMENTAL AND SOMATIC DYSFUNCTION OF HEAD REGION: ICD-10-CM

## 2024-05-08 PROCEDURE — 99213 OFFICE O/P EST LOW 20 MIN: CPT | Performed by: FAMILY MEDICINE

## 2024-05-08 PROCEDURE — 98927 OSTEOPATH MANJ 5-6 REGIONS: CPT | Performed by: FAMILY MEDICINE

## 2024-05-08 PROCEDURE — 3008F BODY MASS INDEX DOCD: CPT | Performed by: FAMILY MEDICINE

## 2024-05-08 NOTE — PROGRESS NOTES
Subjective   Patient ID: Roxanna Hargrove is a 29 y.o. female who presents for right rib pain and upper back and neck pain  HPI  The patient states that she is having right rib.  Occurring on the lateral aspect the rib.  Denies any rashes.  Has had had shingles in the past and denies that this feels like shingles.  Denies any particular injury.  She does have her children sleeping in her bed and she states that they do care for occasionally.  Also having normal neck and back pain the upper back.  Review of Systems    Objective   Physical Exam  General: Patient is alert and orient x3 and appears in no acute distress.  Some pain distress.    Neck: Decreased range of motion in all planes    Heart: Regular rate and rhythm no murmurs clicks or gallops    Lungs: Clear to auscultation bilaterally without rhonchi rales or wheezing      Musculoskeletal: Strength was grossly intact.  Deep tendon reflexes intact.  Sensation intact.  Decreased range of motion    Osteopathic structural:  In the head region there is increased suboccipital tension  In the cervical region C 7 extended rotated left side bent left, C3 posterior tender point on the left  In the rib region first rib on the right was tender to palpation resisted exhalation, rib 8 on the right stuck in inhalation and there is a lateral strain counterstrain tender point on the right rib  In the upper extremity  right upper extremity was protracted inferior tension of pectoralis minor  In the thoracic region  T2 was extended rotated right side bent right, T8 flexed rotated right side bent right        Assessment/Plan   Problem List Items Addressed This Visit    None  Osteopathic manipulative therapy was utilized  In the head region as suboccipital release  In the cervical region as functional positional release  In the upper extremity as muscle energy  In the rib region as functional positional release and strain counterstrain  In the Thoracics as high velocity low amplitude  thrust and muscle energy    Patient tolerated the procedure well and noticed improvement after the treatment.

## 2024-05-23 ENCOUNTER — OFFICE VISIT (OUTPATIENT)
Dept: PRIMARY CARE | Facility: CLINIC | Age: 29
End: 2024-05-23
Payer: COMMERCIAL

## 2024-05-23 VITALS
RESPIRATION RATE: 16 BRPM | OXYGEN SATURATION: 99 % | HEART RATE: 112 BPM | HEIGHT: 61 IN | WEIGHT: 141 LBS | DIASTOLIC BLOOD PRESSURE: 78 MMHG | SYSTOLIC BLOOD PRESSURE: 110 MMHG | BODY MASS INDEX: 26.62 KG/M2

## 2024-05-23 DIAGNOSIS — L02.425 FURUNCLE OF RIGHT THIGH: Primary | ICD-10-CM

## 2024-05-23 PROCEDURE — 1036F TOBACCO NON-USER: CPT | Performed by: FAMILY MEDICINE

## 2024-05-23 PROCEDURE — 99213 OFFICE O/P EST LOW 20 MIN: CPT | Performed by: FAMILY MEDICINE

## 2024-05-23 PROCEDURE — 3008F BODY MASS INDEX DOCD: CPT | Performed by: FAMILY MEDICINE

## 2024-05-23 RX ORDER — CLINDAMYCIN HYDROCHLORIDE 300 MG/1
300 CAPSULE ORAL 3 TIMES DAILY
Qty: 30 CAPSULE | Refills: 0 | Status: SHIPPED | OUTPATIENT
Start: 2024-05-23 | End: 2024-06-02

## 2024-05-23 RX ORDER — MUPIROCIN 20 MG/G
OINTMENT TOPICAL 3 TIMES DAILY
Qty: 22 G | Refills: 0 | Status: SHIPPED | OUTPATIENT
Start: 2024-05-23 | End: 2024-06-02

## 2024-05-23 ASSESSMENT — ENCOUNTER SYMPTOMS
DIARRHEA: 0
COUGH: 0
SORE THROAT: 0
DYSURIA: 0
NAUSEA: 0
VOMITING: 0
DIAPHORESIS: 0
ADENOPATHY: 0
LIGHT-HEADEDNESS: 0
FREQUENCY: 0
UNEXPECTED WEIGHT CHANGE: 0
NUMBNESS: 0
SINUS PAIN: 0
NERVOUS/ANXIOUS: 0
CHEST TIGHTNESS: 0
PALPITATIONS: 0
ABDOMINAL PAIN: 0
SINUS PRESSURE: 0
CONFUSION: 0
FEVER: 0
DIZZINESS: 0
HEMATURIA: 0
HEADACHES: 0
SHORTNESS OF BREATH: 0
POLYPHAGIA: 0
POLYDIPSIA: 0
CHILLS: 0
DYSPHORIC MOOD: 0
WHEEZING: 0
CONSTIPATION: 0

## 2024-05-23 NOTE — PROGRESS NOTES
"Subjective   Patient ID: Roxanna Hargrove is a 29 y.o. female who presents for Recurrent Skin Infections.    HPI   Patient concerned about lesion on her right inner thigh. Noticed it about 2 days ago. Painful. No drainage.     Review of Systems   Constitutional:  Negative for chills, diaphoresis, fever and unexpected weight change.   HENT:  Negative for congestion, sinus pressure, sinus pain, sneezing and sore throat.    Respiratory:  Negative for cough, chest tightness, shortness of breath and wheezing.    Cardiovascular:  Negative for chest pain, palpitations and leg swelling.   Gastrointestinal:  Negative for abdominal pain, constipation, diarrhea, nausea and vomiting.   Endocrine: Negative for cold intolerance, heat intolerance, polydipsia, polyphagia and polyuria.   Genitourinary:  Negative for dysuria, frequency, hematuria and urgency.   Skin:         + boil   Neurological:  Negative for dizziness, syncope, light-headedness, numbness and headaches.   Hematological:  Negative for adenopathy.   Psychiatric/Behavioral:  Negative for confusion and dysphoric mood. The patient is not nervous/anxious.        Objective   /78 (BP Location: Right arm, Patient Position: Sitting, BP Cuff Size: Adult)   Pulse (!) 112   Resp 16   Ht 1.549 m (5' 1\")   Wt 64 kg (141 lb)   SpO2 99%   BMI 26.64 kg/m²     Physical Exam  Vitals and nursing note reviewed.   Constitutional:       General: She is not in acute distress.     Appearance: Normal appearance.   HENT:      Head: Normocephalic and atraumatic.      Nose: Nose normal.   Eyes:      Extraocular Movements: Extraocular movements intact.      Conjunctiva/sclera: Conjunctivae normal.      Pupils: Pupils are equal, round, and reactive to light.   Cardiovascular:      Rate and Rhythm: Normal rate and regular rhythm.      Heart sounds: No murmur heard.     No friction rub. No gallop.   Pulmonary:      Effort: Pulmonary effort is normal.      Breath sounds: Normal breath " sounds. No wheezing, rhonchi or rales.   Abdominal:      General: Bowel sounds are normal. There is no distension.      Palpations: Abdomen is soft.      Tenderness: There is no abdominal tenderness.   Musculoskeletal:         General: Normal range of motion.      Cervical back: Normal range of motion and neck supple.   Skin:     General: Skin is warm and dry.             Comments: Boil on skin as above    Neurological:      General: No focal deficit present.      Mental Status: She is alert and oriented to person, place, and time.      Deep Tendon Reflexes: Reflexes normal.   Psychiatric:         Mood and Affect: Mood normal.         Behavior: Behavior normal.         Thought Content: Thought content normal.         Judgment: Judgment normal.         Assessment/Plan   Problem List Items Addressed This Visit             ICD-10-CM    Furuncle of right thigh - Primary L02.425     - clindamycin 300 mg three times daily   - bactroban ointment three times daily   - warm compresses three times daily          Relevant Medications    clindamycin (Cleocin) 300 mg capsule    mupirocin (Bactroban) 2 % ointment

## 2024-05-23 NOTE — ASSESSMENT & PLAN NOTE
- clindamycin 300 mg three times daily   - bactroban ointment three times daily   - warm compresses three times daily

## 2024-06-03 ENCOUNTER — OFFICE VISIT (OUTPATIENT)
Dept: PRIMARY CARE | Facility: CLINIC | Age: 29
End: 2024-06-03
Payer: COMMERCIAL

## 2024-06-03 VITALS
SYSTOLIC BLOOD PRESSURE: 119 MMHG | RESPIRATION RATE: 16 BRPM | OXYGEN SATURATION: 100 % | HEIGHT: 61 IN | DIASTOLIC BLOOD PRESSURE: 84 MMHG | BODY MASS INDEX: 26.24 KG/M2 | HEART RATE: 81 BPM | WEIGHT: 139 LBS

## 2024-06-03 DIAGNOSIS — M99.08 SEGMENTAL AND SOMATIC DYSFUNCTION OF RIB CAGE: ICD-10-CM

## 2024-06-03 DIAGNOSIS — M99.00 SEGMENTAL AND SOMATIC DYSFUNCTION OF HEAD REGION: ICD-10-CM

## 2024-06-03 DIAGNOSIS — M99.01 SEGMENTAL AND SOMATIC DYSFUNCTION OF CERVICAL REGION: ICD-10-CM

## 2024-06-03 DIAGNOSIS — M99.02 SEGMENTAL AND SOMATIC DYSFUNCTION OF THORACIC REGION: ICD-10-CM

## 2024-06-03 DIAGNOSIS — M54.2 DORSALGIA OF CERVICOTHORACIC REGION: Primary | ICD-10-CM

## 2024-06-03 DIAGNOSIS — M99.07 SEGMENTAL AND SOMATIC DYSFUNCTION OF UPPER EXTREMITY: ICD-10-CM

## 2024-06-03 DIAGNOSIS — M54.6 DORSALGIA OF CERVICOTHORACIC REGION: Primary | ICD-10-CM

## 2024-06-03 PROCEDURE — 99213 OFFICE O/P EST LOW 20 MIN: CPT | Performed by: FAMILY MEDICINE

## 2024-06-03 PROCEDURE — 1036F TOBACCO NON-USER: CPT | Performed by: FAMILY MEDICINE

## 2024-06-03 PROCEDURE — 98927 OSTEOPATH MANJ 5-6 REGIONS: CPT | Performed by: FAMILY MEDICINE

## 2024-06-03 PROCEDURE — 3008F BODY MASS INDEX DOCD: CPT | Performed by: FAMILY MEDICINE

## 2024-06-03 NOTE — PROGRESS NOTES
Subjective   Patient ID: Roxanna Hargrove is a 29 y.o. female who presents for upper back pain  HPI  Headaches.  Daily.  1 month.  Frontal in nature.  Excedrin daily.  This relieves.  Drink medium coffee in the AM.  Sometimes nausea.  No issues with lights and sound.  Relief with OMT  Review of Systems    Objective   Physical Exam  General: Patient is alert and orient x3 and appears in no acute distress.  Some pain distress.    Neck: Decreased range of motion in all planes    Heart: Regular rate and rhythm no murmurs clicks or gallops    Lungs: Clear to auscultation bilaterally without rhonchi rales or wheezing      Musculoskeletal: Strength was grossly intact.  Deep tendon reflexes intact.  Sensation intact.  Decreased range of motion    Osteopathic structural:  In the head region there is increased suboccipital tension  In the cervical region C2 extended rotated right side bent left  In the rib region first rib on the left was tender to palpation resisted exhalation  In the upper extremity  right upper extremity was protracted inferior tension of pectoralis minor  In the thoracic region  T2 was extended rotated right side bent right, T7 flexed rotated right side bent right        Assessment/Plan   Problem List Items Addressed This Visit    None  Visit Diagnoses       Dorsalgia of cervicothoracic region    -  Primary    Segmental and somatic dysfunction of cervical region        Segmental and somatic dysfunction of rib cage        Segmental and somatic dysfunction of thoracic region        Segmental and somatic dysfunction of head region        Segmental and somatic dysfunction of upper extremity                Osteopathic manipulative therapy was used  In the head region as suboccipital release  In the cervical region as functional positional release  In the upper extremity as muscle energy  In the rib region as functional positional release  In the Thoracics as high velocity low amplitude thrust and muscle  energy      Patient tolerated the procedure well and noticed improvement after the treatment.

## 2024-06-06 ENCOUNTER — TELEPHONE (OUTPATIENT)
Dept: PRIMARY CARE | Facility: CLINIC | Age: 29
End: 2024-06-06
Payer: COMMERCIAL

## 2024-06-06 DIAGNOSIS — K59.04 CHRONIC IDIOPATHIC CONSTIPATION: ICD-10-CM

## 2024-06-06 RX ORDER — PLECANATIDE 3 MG/1
3 TABLET ORAL DAILY
Qty: 90 TABLET | Refills: 1 | Status: SHIPPED | OUTPATIENT
Start: 2024-06-06 | End: 2024-12-03

## 2024-06-12 ENCOUNTER — PROCEDURE VISIT (OUTPATIENT)
Dept: PRIMARY CARE | Facility: CLINIC | Age: 29
End: 2024-06-12
Payer: COMMERCIAL

## 2024-06-12 VITALS
BODY MASS INDEX: 26.06 KG/M2 | SYSTOLIC BLOOD PRESSURE: 98 MMHG | OXYGEN SATURATION: 99 % | HEART RATE: 86 BPM | WEIGHT: 138 LBS | RESPIRATION RATE: 16 BRPM | HEIGHT: 61 IN | DIASTOLIC BLOOD PRESSURE: 70 MMHG

## 2024-06-12 DIAGNOSIS — M99.07 SEGMENTAL AND SOMATIC DYSFUNCTION OF UPPER EXTREMITY: ICD-10-CM

## 2024-06-12 DIAGNOSIS — M99.01 SEGMENTAL AND SOMATIC DYSFUNCTION OF CERVICAL REGION: ICD-10-CM

## 2024-06-12 DIAGNOSIS — R20.2 PARESTHESIA OF BOTH HANDS: Primary | ICD-10-CM

## 2024-06-12 DIAGNOSIS — M99.00 SEGMENTAL AND SOMATIC DYSFUNCTION OF HEAD REGION: ICD-10-CM

## 2024-06-12 DIAGNOSIS — M99.08 SEGMENTAL AND SOMATIC DYSFUNCTION OF RIB CAGE: ICD-10-CM

## 2024-06-12 PROCEDURE — 99213 OFFICE O/P EST LOW 20 MIN: CPT | Performed by: FAMILY MEDICINE

## 2024-06-12 PROCEDURE — 98927 OSTEOPATH MANJ 5-6 REGIONS: CPT | Performed by: FAMILY MEDICINE

## 2024-06-12 ASSESSMENT — PATIENT HEALTH QUESTIONNAIRE - PHQ9
SUM OF ALL RESPONSES TO PHQ9 QUESTIONS 1 AND 2: 0
2. FEELING DOWN, DEPRESSED OR HOPELESS: NOT AT ALL
1. LITTLE INTEREST OR PLEASURE IN DOING THINGS: NOT AT ALL

## 2024-06-12 NOTE — PROGRESS NOTES
Subjective   Patient ID: Roxanna Hargrove is a 29 y.o. female who presents for tingling in right hand.  HPI  The patient woke this morning is having tingling to her right hand.  Is occurring in the median nerve distribution.  Is the first time that she remembers experiencing this.  Feels that she might of slept on it wrong she denies any weakness in the extremities.  Denies any problems with neck pain outside of her normal neck pain.  Review of Systems    Objective   Physical Exam  General: Patient is alert and appears in no acute distress    Eyes: EOMI, PERRLA    Neck: No adenopathy, decreased range of motion    Heart: Regular rate and rhythm no murmurs clicks or gallops    Lungs: Clear auscultation bilateral, no rales or wheezing    Musculoskeletal: Strength is grossly intact in the upper extremities at 5 out of 5 in the proximal and distal muscles.  Deep to reflexes were plus 2 out of 4 and symmetric at the bicep and tricep.  Patient had positive median nerve compression test bilaterally, positive Tinel's test bilaterally.  There is increased tension throughout the extensor muscles of the forearm.  Trigger points noted bilaterally in the extensor muscles of the forearm and also the trapezius bilaterally    Osteopathic structural: In the head region there is increased suboccipital tension, in the cervical region C7 extended rotated left side bent left, in the thoracic region T2 was extended rotated right side bent right, bilateral of the upper extremities are protracted with tension of pectoralis minor, left radial head resisted superior motion, first rib on the right was stuck in inhalation.  Assessment/Plan   Problem List Items Addressed This Visit    None  Visit Diagnoses       Paresthesia of both hands    -  Primary    Segmental and somatic dysfunction of cervical region        Segmental and somatic dysfunction of head region        Segmental and somatic dysfunction of rib cage        Segmental and somatic  dysfunction of upper extremity            Osteopathic manipulative therapy was utilized  In the head region as suboccipital release  In the cervical region as functional positional release  In the upper extremity as muscle energy  In the rib region as functional positional release  In the Thoracics as high velocity low amplitude thrust and muscle energy    Patient tolerated the procedure well and noticed improvement after the treatment.    Dry needling was done to the extensor muscles of the forearm and also of the trapezius muscle bilaterally    Discussed exercises, possible cock-up splint, icing.

## 2024-07-01 ENCOUNTER — OFFICE VISIT (OUTPATIENT)
Dept: PRIMARY CARE | Facility: CLINIC | Age: 29
End: 2024-07-01
Payer: COMMERCIAL

## 2024-07-01 VITALS
DIASTOLIC BLOOD PRESSURE: 78 MMHG | WEIGHT: 138 LBS | SYSTOLIC BLOOD PRESSURE: 114 MMHG | HEART RATE: 89 BPM | OXYGEN SATURATION: 99 % | HEIGHT: 61 IN | TEMPERATURE: 99 F | BODY MASS INDEX: 26.06 KG/M2

## 2024-07-01 DIAGNOSIS — J30.1 SEASONAL ALLERGIC RHINITIS DUE TO POLLEN: Primary | ICD-10-CM

## 2024-07-01 PROCEDURE — 3008F BODY MASS INDEX DOCD: CPT | Performed by: FAMILY MEDICINE

## 2024-07-01 PROCEDURE — 99213 OFFICE O/P EST LOW 20 MIN: CPT | Performed by: FAMILY MEDICINE

## 2024-07-01 RX ORDER — FLUTICASONE PROPIONATE 50 MCG
1 SPRAY, SUSPENSION (ML) NASAL DAILY
Qty: 16 G | Refills: 11 | Status: SHIPPED | OUTPATIENT
Start: 2024-07-01 | End: 2025-07-01

## 2024-07-01 RX ORDER — LORATADINE 10 MG/1
10 TABLET ORAL DAILY
Qty: 30 TABLET | Refills: 2 | Status: SHIPPED | OUTPATIENT
Start: 2024-07-01 | End: 2024-09-29

## 2024-07-01 ASSESSMENT — PATIENT HEALTH QUESTIONNAIRE - PHQ9
2. FEELING DOWN, DEPRESSED OR HOPELESS: NOT AT ALL
SUM OF ALL RESPONSES TO PHQ9 QUESTIONS 1 AND 2: 0
1. LITTLE INTEREST OR PLEASURE IN DOING THINGS: NOT AT ALL

## 2024-07-01 NOTE — PROGRESS NOTES
Subjective   Patient ID: Roxanna Hargrove is a 29 y.o. female who presents for Sinus Problem (Sinus pain and headache, sore throat, jaw hurts, drainage).  HPI  The patient is coming the office today with a chief complaint of sinus pressure and pain.  She states that she has a headache and her jaw hurts as well.  Has not been using any allergy medicine.  Denies any colored drainage..  Admits to clear drainage.  Denies any pain or discharge from the ears.  This has only been going on over 3 days..  Review of Systems    Objective   Physical Exam  General: Patient is alert and oriented ×3 and appears in no acute distress. No respiratory distress.  Appears sick    Head: Denies maxillary sinus tenderness and frontal sinus tenderness    Eyes: EOMI, PERRLA.  No conjunctival injection    Ears: Canals patent without any irritation, tympanic membranes without inflammation, no swelling, normal light reflex.    Nose: Nares patent. Turbinates are swollen.  Clear discharge.    Mouth: Normal mucosa. Moist. No erythema, exudates, tonsillar enlargement.,  Drainage down the posterior pharynx    Neck: decreased range of motion, no masses.  No anterior cervical or posterior cervical adenopathy.    Heart: Regular rate and rhythm, no murmurs clicks or gallops    Lungs: Clear to auscultation bilaterally without any rhonchi rales or wheezing, lung sounds heard throughout all lung fields    Abdomen: Soft, nontender, no rigidity, rebound, guarding or organomegaly. Bowel sounds ×4 quadrants.    Skin: Intact, dry, no rashes or erythema  Assessment/Plan   Problem List Items Addressed This Visit       Seasonal allergic rhinitis due to pollen - Primary    Relevant Medications    fluticasone (Flonase) 50 mcg/actuation nasal spray    loratadine (Claritin) 10 mg tablet   It does not appear that the patient has a sinus infection at this time.  We did discuss trying allergy medicine and she was sent fluticasone nasal spray to use 1 to 2 sprays per  [place]  Also given Claritin 10 mg daily  If she has any worsening symptoms including fever, worsening sinus pain or pressure, colored drainage starting over the next 7 days to 10 days we will consider an antibiotic at this time I feel that she should just try symptomatic care.  Patient has had many antibiotics in the past.

## 2024-07-11 ENCOUNTER — PROCEDURE VISIT (OUTPATIENT)
Dept: PRIMARY CARE | Facility: CLINIC | Age: 29
End: 2024-07-11
Payer: COMMERCIAL

## 2024-07-11 VITALS
TEMPERATURE: 97.7 F | DIASTOLIC BLOOD PRESSURE: 64 MMHG | HEIGHT: 61 IN | HEART RATE: 93 BPM | WEIGHT: 137 LBS | SYSTOLIC BLOOD PRESSURE: 108 MMHG | BODY MASS INDEX: 25.86 KG/M2 | OXYGEN SATURATION: 100 %

## 2024-07-11 DIAGNOSIS — F90.2 ATTENTION DEFICIT HYPERACTIVITY DISORDER (ADHD), COMBINED TYPE: ICD-10-CM

## 2024-07-11 DIAGNOSIS — M99.02 SEGMENTAL AND SOMATIC DYSFUNCTION OF THORACIC REGION: ICD-10-CM

## 2024-07-11 DIAGNOSIS — M99.08 SEGMENTAL AND SOMATIC DYSFUNCTION OF RIB CAGE: ICD-10-CM

## 2024-07-11 DIAGNOSIS — M99.01 SEGMENTAL AND SOMATIC DYSFUNCTION OF CERVICAL REGION: ICD-10-CM

## 2024-07-11 DIAGNOSIS — M99.00 SEGMENTAL AND SOMATIC DYSFUNCTION OF HEAD REGION: ICD-10-CM

## 2024-07-11 DIAGNOSIS — M54.2 DORSALGIA OF CERVICOTHORACIC REGION: Primary | ICD-10-CM

## 2024-07-11 DIAGNOSIS — M99.07 SEGMENTAL AND SOMATIC DYSFUNCTION OF UPPER EXTREMITY: ICD-10-CM

## 2024-07-11 DIAGNOSIS — M54.6 DORSALGIA OF CERVICOTHORACIC REGION: Primary | ICD-10-CM

## 2024-07-11 PROBLEM — R07.81 RIB PAIN: Status: ACTIVE | Noted: 2024-07-11

## 2024-07-11 PROCEDURE — 99213 OFFICE O/P EST LOW 20 MIN: CPT | Performed by: FAMILY MEDICINE

## 2024-07-11 PROCEDURE — 98927 OSTEOPATH MANJ 5-6 REGIONS: CPT | Performed by: FAMILY MEDICINE

## 2024-07-11 RX ORDER — DEXTROAMPHETAMINE SACCHARATE, AMPHETAMINE ASPARTATE, DEXTROAMPHETAMINE SULFATE AND AMPHETAMINE SULFATE 1.25; 1.25; 1.25; 1.25 MG/1; MG/1; MG/1; MG/1
5 TABLET ORAL DAILY
Qty: 30 TABLET | Refills: 0 | Status: SHIPPED | OUTPATIENT
Start: 2024-07-11 | End: 2024-08-10

## 2024-07-11 ASSESSMENT — ENCOUNTER SYMPTOMS: BACK PAIN: 1

## 2024-07-11 ASSESSMENT — PATIENT HEALTH QUESTIONNAIRE - PHQ9
1. LITTLE INTEREST OR PLEASURE IN DOING THINGS: NOT AT ALL
SUM OF ALL RESPONSES TO PHQ9 QUESTIONS 1 AND 2: 0
2. FEELING DOWN, DEPRESSED OR HOPELESS: NOT AT ALL

## 2024-07-11 NOTE — PROGRESS NOTES
Subjective   Patient ID: Roxanna Hargrove is a 29 y.o. female who presents for upper back pain  Back Pain      Headaches.  Daily.  1 month.  Frontal in nature.  Excedrin daily.  This relieves.  Drink medium coffee in the AM.  Sometimes nausea.  No issues with lights and sound.  Relief with OMT  Review of Systems    Objective   Physical Exam  General: Patient is alert and orient x3 and appears in no acute distress.  Some pain distress.    Neck: Decreased range of motion in all planes    Heart: Regular rate and rhythm no murmurs clicks or gallops    Lungs: Clear to auscultation bilaterally without rhonchi rales or wheezing      Musculoskeletal: Strength was grossly intact.  Deep tendon reflexes intact.  Sensation intact.  Decreased range of motion    Osteopathic structural:  In the head region there is increased suboccipital tension  In the cervical region C2 extended rotated right side bent left  In the rib region first rib on the left was tender to palpation resisted exhalation  In the upper extremity  right upper extremity was protracted inferior tension of pectoralis minor  In the thoracic region  T2 was extended rotated right side bent right, T7 flexed rotated right side bent right        Assessment/Plan   Problem List Items Addressed This Visit    None        Osteopathic manipulative therapy was used  In the head region as suboccipital release  In the cervical region as functional positional release  In the upper extremity as muscle energy  In the rib region as functional positional release  In the Thoracics as high velocity low amplitude thrust and muscle energy      Patient tolerated the procedure well and noticed improvement after the treatment.

## 2024-07-11 NOTE — TELEPHONE ENCOUNTER
Last appointment: 7/1/2024  Next appointment: 7/11/2024     Eprescribed. I have personally reviewed the OARRS report.  This report is scanned into the electronic medical record.  I have considered the risks of abuse, dependence, addiction and diversion.  I believe that it is clinically appropriate to prescribe this medication.

## 2024-07-18 ENCOUNTER — APPOINTMENT (OUTPATIENT)
Dept: PRIMARY CARE | Facility: CLINIC | Age: 29
End: 2024-07-18
Payer: COMMERCIAL

## 2024-07-26 ENCOUNTER — APPOINTMENT (OUTPATIENT)
Dept: OBSTETRICS AND GYNECOLOGY | Facility: CLINIC | Age: 29
End: 2024-07-26
Payer: COMMERCIAL

## 2024-07-27 ENCOUNTER — HOSPITAL ENCOUNTER (EMERGENCY)
Facility: HOSPITAL | Age: 29
Discharge: HOME | End: 2024-07-27
Payer: COMMERCIAL

## 2024-07-27 VITALS
DIASTOLIC BLOOD PRESSURE: 80 MMHG | OXYGEN SATURATION: 100 % | SYSTOLIC BLOOD PRESSURE: 119 MMHG | WEIGHT: 136 LBS | HEIGHT: 60 IN | TEMPERATURE: 98.4 F | RESPIRATION RATE: 17 BRPM | BODY MASS INDEX: 26.7 KG/M2 | HEART RATE: 78 BPM

## 2024-07-27 PROCEDURE — 4500999001 HC ED NO CHARGE: Performed by: EMERGENCY MEDICINE

## 2024-07-27 ASSESSMENT — PAIN DESCRIPTION - PAIN TYPE: TYPE: ACUTE PAIN

## 2024-07-27 ASSESSMENT — COLUMBIA-SUICIDE SEVERITY RATING SCALE - C-SSRS
2. HAVE YOU ACTUALLY HAD ANY THOUGHTS OF KILLING YOURSELF?: NO
1. IN THE PAST MONTH, HAVE YOU WISHED YOU WERE DEAD OR WISHED YOU COULD GO TO SLEEP AND NOT WAKE UP?: NO
6. HAVE YOU EVER DONE ANYTHING, STARTED TO DO ANYTHING, OR PREPARED TO DO ANYTHING TO END YOUR LIFE?: NO

## 2024-07-27 ASSESSMENT — PAIN DESCRIPTION - LOCATION: LOCATION: ABDOMEN

## 2024-07-27 ASSESSMENT — PAIN DESCRIPTION - DESCRIPTORS: DESCRIPTORS: ACHING

## 2024-07-27 ASSESSMENT — PAIN - FUNCTIONAL ASSESSMENT: PAIN_FUNCTIONAL_ASSESSMENT: 0-10

## 2024-07-27 ASSESSMENT — PAIN SCALES - GENERAL: PAINLEVEL_OUTOF10: 8

## 2024-07-29 DIAGNOSIS — F90.2 ATTENTION DEFICIT HYPERACTIVITY DISORDER (ADHD), COMBINED TYPE: ICD-10-CM

## 2024-07-29 DIAGNOSIS — R00.2 HEART PALPITATIONS: ICD-10-CM

## 2024-07-29 RX ORDER — NEBIVOLOL 5 MG/1
5 TABLET ORAL DAILY
Qty: 90 TABLET | Refills: 1 | Status: SHIPPED | OUTPATIENT
Start: 2024-07-29 | End: 2025-01-25

## 2024-07-29 RX ORDER — DEXTROAMPHETAMINE SACCHARATE, AMPHETAMINE ASPARTATE MONOHYDRATE, DEXTROAMPHETAMINE SULFATE AND AMPHETAMINE SULFATE 5; 5; 5; 5 MG/1; MG/1; MG/1; MG/1
20 CAPSULE, EXTENDED RELEASE ORAL EVERY MORNING
Qty: 30 CAPSULE | Refills: 0 | Status: SHIPPED | OUTPATIENT
Start: 2024-07-29 | End: 2024-08-28

## 2024-08-06 ENCOUNTER — APPOINTMENT (OUTPATIENT)
Dept: PRIMARY CARE | Facility: CLINIC | Age: 29
End: 2024-08-06
Payer: COMMERCIAL

## 2024-08-06 VITALS
WEIGHT: 134 LBS | TEMPERATURE: 97.6 F | BODY MASS INDEX: 26.31 KG/M2 | SYSTOLIC BLOOD PRESSURE: 103 MMHG | HEART RATE: 103 BPM | HEIGHT: 60 IN | DIASTOLIC BLOOD PRESSURE: 68 MMHG | OXYGEN SATURATION: 99 %

## 2024-08-06 DIAGNOSIS — R73.03 PREDIABETES: ICD-10-CM

## 2024-08-06 DIAGNOSIS — F41.1 GENERALIZED ANXIETY DISORDER: ICD-10-CM

## 2024-08-06 DIAGNOSIS — R00.2 HEART PALPITATIONS: ICD-10-CM

## 2024-08-06 DIAGNOSIS — E53.8 FOLATE DEFICIENCY: ICD-10-CM

## 2024-08-06 DIAGNOSIS — E53.8 VITAMIN B12 DEFICIENCY: ICD-10-CM

## 2024-08-06 DIAGNOSIS — J30.1 SEASONAL ALLERGIC RHINITIS DUE TO POLLEN: ICD-10-CM

## 2024-08-06 DIAGNOSIS — E66.3 OVERWEIGHT WITH BODY MASS INDEX (BMI) OF 26 TO 26.9 IN ADULT: ICD-10-CM

## 2024-08-06 DIAGNOSIS — F90.2 ATTENTION DEFICIT HYPERACTIVITY DISORDER (ADHD), COMBINED TYPE: ICD-10-CM

## 2024-08-06 DIAGNOSIS — J45.30 MILD PERSISTENT ASTHMA WITHOUT COMPLICATION (HHS-HCC): Primary | ICD-10-CM

## 2024-08-06 DIAGNOSIS — E78.2 MIXED HYPERLIPIDEMIA: ICD-10-CM

## 2024-08-06 DIAGNOSIS — Z11.4 ENCOUNTER FOR SCREENING FOR HIV: ICD-10-CM

## 2024-08-06 DIAGNOSIS — F32.1 MODERATE MAJOR DEPRESSION (MULTI): ICD-10-CM

## 2024-08-06 PROBLEM — O32.9XX0: Status: RESOLVED | Noted: 2024-03-21 | Resolved: 2024-08-06

## 2024-08-06 PROBLEM — O20.0 THREATENED ABORTION (HHS-HCC): Status: RESOLVED | Noted: 2024-03-21 | Resolved: 2024-08-06

## 2024-08-06 PROBLEM — E66.9 OBESITY WITH BODY MASS INDEX 30 OR GREATER: Status: RESOLVED | Noted: 2023-10-31 | Resolved: 2024-08-06

## 2024-08-06 PROBLEM — R73.02 IMPAIRED GLUCOSE TOLERANCE: Status: RESOLVED | Noted: 2023-10-31 | Resolved: 2024-08-06

## 2024-08-06 PROBLEM — L02.425: Status: RESOLVED | Noted: 2024-05-23 | Resolved: 2024-08-06

## 2024-08-06 PROBLEM — F51.02 INSOMNIA, TRANSIENT: Status: RESOLVED | Noted: 2023-10-31 | Resolved: 2024-08-06

## 2024-08-06 PROBLEM — R14.0 ABDOMINAL BLOATING: Status: RESOLVED | Noted: 2024-03-21 | Resolved: 2024-08-06

## 2024-08-06 PROBLEM — R07.81 RIB PAIN: Status: RESOLVED | Noted: 2024-07-11 | Resolved: 2024-08-06

## 2024-08-06 PROBLEM — T14.8XXA CONTUSION: Status: RESOLVED | Noted: 2024-03-21 | Resolved: 2024-08-06

## 2024-08-06 PROBLEM — L70.0 ACNE VULGARIS: Status: RESOLVED | Noted: 2024-01-22 | Resolved: 2024-08-06

## 2024-08-06 PROBLEM — R51.9 HEADACHE: Status: RESOLVED | Noted: 2024-03-21 | Resolved: 2024-08-06

## 2024-08-06 PROBLEM — R68.89 INTOLERANT OF COLD: Status: RESOLVED | Noted: 2024-03-21 | Resolved: 2024-08-06

## 2024-08-06 PROBLEM — R11.0 NAUSEA: Status: RESOLVED | Noted: 2024-03-21 | Resolved: 2024-08-06

## 2024-08-06 PROBLEM — B80 ENTEROBIASIS: Status: RESOLVED | Noted: 2024-03-21 | Resolved: 2024-08-06

## 2024-08-06 PROBLEM — J45.21: Status: ACTIVE | Noted: 2023-06-15

## 2024-08-06 PROBLEM — R41.840 INATTENTION: Status: RESOLVED | Noted: 2023-10-31 | Resolved: 2024-08-06

## 2024-08-06 PROCEDURE — 1036F TOBACCO NON-USER: CPT | Performed by: FAMILY MEDICINE

## 2024-08-06 PROCEDURE — 3008F BODY MASS INDEX DOCD: CPT | Performed by: FAMILY MEDICINE

## 2024-08-06 PROCEDURE — 99214 OFFICE O/P EST MOD 30 MIN: CPT | Performed by: FAMILY MEDICINE

## 2024-08-06 RX ORDER — MONTELUKAST SODIUM 10 MG/1
10 TABLET ORAL NIGHTLY
COMMUNITY
Start: 2024-08-06

## 2024-08-06 RX ORDER — MONTELUKAST SODIUM 10 MG/1
10 TABLET ORAL
COMMUNITY
End: 2024-08-06 | Stop reason: SDUPTHER

## 2024-08-06 ASSESSMENT — ENCOUNTER SYMPTOMS
FREQUENCY: 0
SINUS PRESSURE: 0
UNEXPECTED WEIGHT CHANGE: 0
ADENOPATHY: 0
SORE THROAT: 0
DIAPHORESIS: 0
WHEEZING: 0
DIZZINESS: 0
DYSPHORIC MOOD: 0
DYSURIA: 0
NAUSEA: 0
COUGH: 0
NUMBNESS: 0
LIGHT-HEADEDNESS: 0
POLYPHAGIA: 0
SHORTNESS OF BREATH: 0
HEADACHES: 0
NERVOUS/ANXIOUS: 0
DIARRHEA: 0
FEVER: 0
CHEST TIGHTNESS: 0
POLYDIPSIA: 0
PALPITATIONS: 0
ABDOMINAL PAIN: 0
HEMATURIA: 0
CONSTIPATION: 0
CONFUSION: 0
SINUS PAIN: 0
VOMITING: 0
CHILLS: 0

## 2024-08-06 ASSESSMENT — PATIENT HEALTH QUESTIONNAIRE - PHQ9
10. IF YOU CHECKED OFF ANY PROBLEMS, HOW DIFFICULT HAVE THESE PROBLEMS MADE IT FOR YOU TO DO YOUR WORK, TAKE CARE OF THINGS AT HOME, OR GET ALONG WITH OTHER PEOPLE: SOMEWHAT DIFFICULT
1. LITTLE INTEREST OR PLEASURE IN DOING THINGS: NOT AT ALL
3. TROUBLE FALLING OR STAYING ASLEEP: SEVERAL DAYS
SUM OF ALL RESPONSES TO PHQ QUESTIONS 1-9: 2
7. TROUBLE CONCENTRATING ON THINGS, SUCH AS READING THE NEWSPAPER OR WATCHING TELEVISION: NOT AT ALL
2. FEELING DOWN, DEPRESSED OR HOPELESS: SEVERAL DAYS
SUM OF ALL RESPONSES TO PHQ9 QUESTIONS 1 & 2: 1
6. FEELING BAD ABOUT YOURSELF - OR THAT YOU ARE A FAILURE OR HAVE LET YOURSELF OR YOUR FAMILY DOWN: NOT AT ALL
8. MOVING OR SPEAKING SO SLOWLY THAT OTHER PEOPLE COULD HAVE NOTICED. OR THE OPPOSITE, BEING SO FIGETY OR RESTLESS THAT YOU HAVE BEEN MOVING AROUND A LOT MORE THAN USUAL: NOT AT ALL
9. THOUGHTS THAT YOU WOULD BE BETTER OFF DEAD, OR OF HURTING YOURSELF: NOT AT ALL
4. FEELING TIRED OR HAVING LITTLE ENERGY: NOT AT ALL
5. POOR APPETITE OR OVEREATING: NOT AT ALL

## 2024-08-06 ASSESSMENT — ANXIETY QUESTIONNAIRES
GAD7 TOTAL SCORE: 2
1. FEELING NERVOUS, ANXIOUS, OR ON EDGE: SEVERAL DAYS
3. WORRYING TOO MUCH ABOUT DIFFERENT THINGS: NOT AT ALL
6. BECOMING EASILY ANNOYED OR IRRITABLE: SEVERAL DAYS
5. BEING SO RESTLESS THAT IT IS HARD TO SIT STILL: NOT AT ALL
4. TROUBLE RELAXING: NOT AT ALL
2. NOT BEING ABLE TO STOP OR CONTROL WORRYING: NOT AT ALL
7. FEELING AFRAID AS IF SOMETHING AWFUL MIGHT HAPPEN: NOT AT ALL
IF YOU CHECKED OFF ANY PROBLEMS ON THIS QUESTIONNAIRE, HOW DIFFICULT HAVE THESE PROBLEMS MADE IT FOR YOU TO DO YOUR WORK, TAKE CARE OF THINGS AT HOME, OR GET ALONG WITH OTHER PEOPLE: SOMEWHAT DIFFICULT

## 2024-08-06 NOTE — ASSESSMENT & PLAN NOTE
- controlled. Continue adderall   - had been tolerating adderall without palpitations until recent development 2 weeks ago. Will await cardiology's opinion

## 2024-08-06 NOTE — PROGRESS NOTES
Subjective   Patient ID: Roxanna Hargrove is a 29 y.o. female who presents for Follow-up (Needs a referral to Cardiology ).    HPI   routine follow up. chronic issues as per assessment and plan.     Would like to see cardiology. She states that sometimes it feels like  her heart is fluttering. This is even with the bystolic. She is getting dizzy as well. She has seen cardiology in the past. These symptoms have been ongoing for about 2 weeks. She is on adderall but palpitations do not seem related as she gets them on days she does not take the adderall also.     OARRS:  Rose Joiner MD on 8/6/2024  9:31 AM  I have personally reviewed the OARRS report for Roxanna Hargrove. I have considered the risks of abuse, dependence, addiction and diversion and I believe that it is clinically appropriate for Roxanna Hargrove to be prescribed this medication    Is the patient prescribed a combination of a benzodiazepine and opioid?  No    Last Urine Drug Screen / ordered today: No  Recent Results (from the past 8760 hour(s))   Amphetamine Confirm, Urine    Collection Time: 11/09/23 10:25 AM   Result Value Ref Range    Methamphetamine Quant, Ur <200 ng/mL    MDA, Urine <200 ng/mL    MDEA, Urine <200 ng/mL    Phentermine,Urine <200 ng/mL    Amphetamines,Urine >5000 ng/mL    MDMA, Urine <200 ng/mL   Drug Screen, Urine With Reflex to Confirmation    Collection Time: 11/09/23 10:25 AM   Result Value Ref Range    Amphetamine Screen, Urine Presumptive Positive (A) Presumptive Negative    Barbiturate Screen, Urine Presumptive Negative Presumptive Negative    Benzodiazepines Screen, Urine Presumptive Negative Presumptive Negative    Cannabinoid Screen, Urine Presumptive Negative Presumptive Negative    Cocaine Metabolite Screen, Urine Presumptive Negative Presumptive Negative    Fentanyl Screen, Urine Presumptive Negative Presumptive Negative    Opiate Screen, Urine Presumptive Negative Presumptive Negative    Oxycodone Screen, Urine  Presumptive Negative Presumptive Negative    PCP Screen, Urine Presumptive Negative Presumptive Negative     Results are as expected.         Controlled Substance Agreement: Stimulants  Date of the Last Agreement: November 2023  Reviewed Controlled Substance Agreement including but not limited to the benefits, risks, and alternatives to treatment with a Controlled Substance medication(s).    Stimulants:   What is the patient's goal of therapy? Improve focus and attention   Is this being achieved with current treatment? yes    Activities of Daily Living:   Is your overall impression that this patient is benefiting (symptom reduction outweighs side effects) from stimulant therapy? Yes     1. Physical Functioning: Better  2. Family Relationship: Better  3. Social Relationship: Better  4. Mood: Better  5. Sleep Patterns: Better  6. Overall Function: Better      Review of Systems   Constitutional:  Negative for chills, diaphoresis, fever and unexpected weight change.   HENT:  Negative for congestion, sinus pressure, sinus pain, sneezing and sore throat.    Respiratory:  Negative for cough, chest tightness, shortness of breath and wheezing.    Cardiovascular:  Negative for chest pain, palpitations and leg swelling.   Gastrointestinal:  Negative for abdominal pain, constipation, diarrhea, nausea and vomiting.   Endocrine: Negative for cold intolerance, heat intolerance, polydipsia, polyphagia and polyuria.   Genitourinary:  Negative for dysuria, frequency, hematuria and urgency.   Neurological:  Negative for dizziness, syncope, light-headedness, numbness and headaches.   Hematological:  Negative for adenopathy.   Psychiatric/Behavioral:  Negative for confusion and dysphoric mood. The patient is not nervous/anxious.        Objective   /68 (BP Location: Right arm, Patient Position: Sitting, BP Cuff Size: Adult)   Pulse 103   Temp 36.4 °C (97.6 °F)   Ht 1.524 m (5')   Wt 60.8 kg (134 lb)   LMP 07/26/2024 (Exact  Date)   SpO2 99%   BMI 26.17 kg/m²     Physical Exam  Vitals and nursing note reviewed.   Constitutional:       General: She is not in acute distress.     Appearance: Normal appearance.   HENT:      Head: Normocephalic and atraumatic.      Nose: Nose normal.   Eyes:      Extraocular Movements: Extraocular movements intact.      Conjunctiva/sclera: Conjunctivae normal.      Pupils: Pupils are equal, round, and reactive to light.   Cardiovascular:      Rate and Rhythm: Normal rate and regular rhythm.      Heart sounds: No murmur heard.     No friction rub. No gallop.   Pulmonary:      Effort: Pulmonary effort is normal.      Breath sounds: Normal breath sounds. No wheezing, rhonchi or rales.   Abdominal:      General: Bowel sounds are normal. There is no distension.      Palpations: Abdomen is soft.      Tenderness: There is no abdominal tenderness.   Musculoskeletal:         General: Normal range of motion.      Cervical back: Normal range of motion and neck supple.   Skin:     General: Skin is warm and dry.   Neurological:      General: No focal deficit present.      Mental Status: She is alert and oriented to person, place, and time.      Deep Tendon Reflexes: Reflexes normal.   Psychiatric:         Mood and Affect: Mood normal.         Behavior: Behavior normal.         Thought Content: Thought content normal.         Judgment: Judgment normal.         Assessment/Plan   Problem List Items Addressed This Visit             ICD-10-CM    Attention deficit hyperactivity disorder (ADHD), combined type F90.2     - controlled. Continue adderall   - had been tolerating adderall without palpitations until recent development 2 weeks ago. Will await cardiology's opinion         Relevant Orders    Vitamin D 25-Hydroxy,Total (for eval of Vitamin D levels)    Vitamin B12    Hemoglobin A1C    CBC and Auto Differential    Comprehensive Metabolic Panel    Lipid Panel    TSH with reflex to Free T4 if abnormal    BMI  26.0-26.9,adult Z68.26     - Encouraged healthy lifestyle, including adequate exercise and high fiber, low fat and low carb diet.          Folate deficiency E53.8    Relevant Orders    Vitamin D 25-Hydroxy,Total (for eval of Vitamin D levels)    Vitamin B12    Hemoglobin A1C    CBC and Auto Differential    Comprehensive Metabolic Panel    Lipid Panel    TSH with reflex to Free T4 if abnormal    Folate    Generalized anxiety disorder F41.1     - controlled off medication. Monitor.          Relevant Orders    Vitamin D 25-Hydroxy,Total (for eval of Vitamin D levels)    Vitamin B12    Hemoglobin A1C    CBC and Auto Differential    Comprehensive Metabolic Panel    Lipid Panel    TSH with reflex to Free T4 if abnormal    Heart palpitations R00.2    Relevant Orders    Vitamin D 25-Hydroxy,Total (for eval of Vitamin D levels)    Vitamin B12    Hemoglobin A1C    CBC and Auto Differential    Comprehensive Metabolic Panel    Lipid Panel    TSH with reflex to Free T4 if abnormal    Referral to Cardiology    Mild persistent asthma without complication ([facility]) - Primary J45.30     - controlled. Continue singulair. Albuterol as needed          Relevant Medications    albuterol 90 mcg/actuation aerosol powdr breath activated inhaler    montelukast (Singulair) 10 mg tablet    Other Relevant Orders    Vitamin D 25-Hydroxy,Total (for eval of Vitamin D levels)    Vitamin B12    Hemoglobin A1C    CBC and Auto Differential    Comprehensive Metabolic Panel    Lipid Panel    TSH with reflex to Free T4 if abnormal    Mixed hyperlipidemia E78.2    Relevant Orders    Vitamin D 25-Hydroxy,Total (for eval of Vitamin D levels)    Vitamin B12    Hemoglobin A1C    CBC and Auto Differential    Comprehensive Metabolic Panel    Lipid Panel    TSH with reflex to Free T4 if abnormal    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 Monitor.          Overweight with body mass index (BMI) of 26 to 26.9 in adult E66.3, Z68.26      - Encouraged healthy lifestyle, including adequate exercise and high fiber, low fat and low carb diet.          Prediabetes R73.03     - Encouraged healthy lifestyle, including adequate exercise and high fiber, low fat and low carb diet.   - check labs          Relevant Orders    Vitamin D 25-Hydroxy,Total (for eval of Vitamin D levels)    Vitamin B12    Hemoglobin A1C    CBC and Auto Differential    Comprehensive Metabolic Panel    Lipid Panel    TSH with reflex to Free T4 if abnormal    Seasonal allergic rhinitis due to pollen J30.1     - continue singulair          Relevant Medications    albuterol 90 mcg/actuation aerosol powdr breath activated inhaler    Vitamin B12 deficiency E53.8    Relevant Orders    Vitamin D 25-Hydroxy,Total (for eval of Vitamin D levels)    Vitamin B12    Hemoglobin A1C    CBC and Auto Differential    Comprehensive Metabolic Panel    Lipid Panel    TSH with reflex to Free T4 if abnormal     Other Visit Diagnoses         Codes    Encounter for screening for HIV     Z11.4    Relevant Orders    HIV 1/2 Antigen/Antibody Screen with Reflex to Confirmation

## 2024-08-06 NOTE — PATIENT INSTRUCTIONS
Roxanna Hargrove ,    Thank you for coming in today. We at [facility] appreciate your trust in our care. If you have any questions or concerns about the care you received today, please do not hesitate to contact us at [phone].    The following instructions were discussed today:    - get labs  - For blood work: Nothing to eat or drink for at least 10 hours prior. Okay for water or black coffee.   - Follow up in 3 months.

## 2024-08-19 ENCOUNTER — APPOINTMENT (OUTPATIENT)
Dept: PRIMARY CARE | Facility: CLINIC | Age: 29
End: 2024-08-19
Payer: COMMERCIAL

## 2024-08-20 ENCOUNTER — TELEPHONE (OUTPATIENT)
Dept: PRIMARY CARE | Facility: CLINIC | Age: 29
End: 2024-08-20
Payer: COMMERCIAL

## 2024-08-20 DIAGNOSIS — B37.31 YEAST VAGINITIS: ICD-10-CM

## 2024-08-20 DIAGNOSIS — E53.8 FOLIC ACID DEFICIENCY: Primary | ICD-10-CM

## 2024-08-20 NOTE — TELEPHONE ENCOUNTER
please let patient know:    1) folic acid is a little low. Does she take folic acid?     2) Remainder of lab work normal.

## 2024-08-21 RX ORDER — FOLIC ACID 1 MG/1
1 TABLET ORAL DAILY
Qty: 90 TABLET | Refills: 3 | Status: SHIPPED | OUTPATIENT
Start: 2024-08-21 | End: 2025-08-21

## 2024-08-21 RX ORDER — FLUCONAZOLE 150 MG/1
TABLET ORAL
Qty: 2 TABLET | Refills: 0 | Status: SHIPPED | OUTPATIENT
Start: 2024-08-21

## 2024-08-22 ENCOUNTER — PROCEDURE VISIT (OUTPATIENT)
Dept: PRIMARY CARE | Facility: CLINIC | Age: 29
End: 2024-08-22
Payer: COMMERCIAL

## 2024-08-22 DIAGNOSIS — M99.01 SEGMENTAL AND SOMATIC DYSFUNCTION OF CERVICAL REGION: ICD-10-CM

## 2024-08-22 DIAGNOSIS — M54.2 DORSALGIA OF CERVICOTHORACIC REGION: Primary | ICD-10-CM

## 2024-08-22 DIAGNOSIS — M99.08 SEGMENTAL AND SOMATIC DYSFUNCTION OF RIB CAGE: ICD-10-CM

## 2024-08-22 DIAGNOSIS — M54.6 DORSALGIA OF CERVICOTHORACIC REGION: Primary | ICD-10-CM

## 2024-08-22 DIAGNOSIS — M99.00 SEGMENTAL AND SOMATIC DYSFUNCTION OF HEAD REGION: ICD-10-CM

## 2024-08-22 DIAGNOSIS — M99.02 SEGMENTAL AND SOMATIC DYSFUNCTION OF THORACIC REGION: ICD-10-CM

## 2024-08-22 DIAGNOSIS — M99.07 SEGMENTAL AND SOMATIC DYSFUNCTION OF UPPER EXTREMITY: ICD-10-CM

## 2024-08-22 PROCEDURE — 99213 OFFICE O/P EST LOW 20 MIN: CPT | Performed by: FAMILY MEDICINE

## 2024-08-22 PROCEDURE — 98927 OSTEOPATH MANJ 5-6 REGIONS: CPT | Performed by: FAMILY MEDICINE

## 2024-08-22 ASSESSMENT — ENCOUNTER SYMPTOMS: BACK PAIN: 1

## 2024-08-22 NOTE — PROGRESS NOTES
Subjective   Patient ID: Roxanna Hargrove is a 29 y.o. female who presents for upper back pain  Back Pain  This is a recurrent problem. The pain is present in the thoracic spine. The pain is moderate.       Review of Systems   Musculoskeletal:  Positive for back pain.       Objective   Physical Exam  General: Patient is alert and orient x3 and appears in no acute distress.  Some pain distress.    Neck: Decreased range of motion in all planes    Heart: Regular rate and rhythm no murmurs clicks or gallops    Lungs: Clear to auscultation bilaterally without rhonchi rales or wheezing      Musculoskeletal: Strength was grossly intact.  Deep tendon reflexes intact.  Sensation intact.  Decreased range of motion    Osteopathic structural:  In the head region there is increased suboccipital tension  In the cervical region C2 extended rotated right side bent left  In the rib region first rib on the left was tender to palpation resisted exhalation  In the upper extremity  right upper extremity was protracted inferior tension of pectoralis minor  In the thoracic region  T2 was extended rotated right side bent right, T7 flexed rotated right side bent right        Assessment/Plan   Problem List Items Addressed This Visit    None  Visit Diagnoses       Dorsalgia of cervicothoracic region    -  Primary    Segmental and somatic dysfunction of cervical region        Segmental and somatic dysfunction of head region        Segmental and somatic dysfunction of rib cage        Segmental and somatic dysfunction of thoracic region        Segmental and somatic dysfunction of upper extremity                  Osteopathic manipulative therapy was used  In the head region as suboccipital release  In the cervical region as functional positional release  In the upper extremity as muscle energy  In the rib region as functional positional release  In the Thoracics as high velocity low amplitude thrust and muscle energy      Patient tolerated the  procedure well and noticed improvement after the treatment.     No

## 2024-09-10 ENCOUNTER — ANCILLARY PROCEDURE (OUTPATIENT)
Dept: CARDIOLOGY | Facility: HOSPITAL | Age: 29
End: 2024-09-10
Payer: COMMERCIAL

## 2024-09-10 ENCOUNTER — OFFICE VISIT (OUTPATIENT)
Dept: CARDIOLOGY | Facility: HOSPITAL | Age: 29
End: 2024-09-10
Payer: COMMERCIAL

## 2024-09-10 VITALS
WEIGHT: 135 LBS | HEART RATE: 85 BPM | BODY MASS INDEX: 26.5 KG/M2 | SYSTOLIC BLOOD PRESSURE: 116 MMHG | DIASTOLIC BLOOD PRESSURE: 80 MMHG | HEIGHT: 60 IN

## 2024-09-10 DIAGNOSIS — R42 DIZZINESS: ICD-10-CM

## 2024-09-10 DIAGNOSIS — R63.4 RECENT WEIGHT LOSS: ICD-10-CM

## 2024-09-10 DIAGNOSIS — R00.2 HEART PALPITATIONS: ICD-10-CM

## 2024-09-10 DIAGNOSIS — R42 DIZZINESS: Primary | ICD-10-CM

## 2024-09-10 LAB — BODY SURFACE AREA: 1.61 M2

## 2024-09-10 PROCEDURE — 93005 ELECTROCARDIOGRAM TRACING: CPT | Performed by: INTERNAL MEDICINE

## 2024-09-10 PROCEDURE — 99204 OFFICE O/P NEW MOD 45 MIN: CPT | Performed by: INTERNAL MEDICINE

## 2024-09-10 PROCEDURE — 3008F BODY MASS INDEX DOCD: CPT | Performed by: INTERNAL MEDICINE

## 2024-09-10 PROCEDURE — 93010 ELECTROCARDIOGRAM REPORT: CPT | Performed by: INTERNAL MEDICINE

## 2024-09-10 PROCEDURE — 1036F TOBACCO NON-USER: CPT | Performed by: INTERNAL MEDICINE

## 2024-09-10 PROCEDURE — 99214 OFFICE O/P EST MOD 30 MIN: CPT | Performed by: INTERNAL MEDICINE

## 2024-09-10 PROCEDURE — 93246 EXT ECG>7D<15D RECORDING: CPT

## 2024-09-10 NOTE — PATIENT INSTRUCTIONS
Thanks for following up in office today.    1)  We talked about your recent weight loss.  With weight loss sometimes medication doses need to be adjusted.   I think that we should have you cut your bystolic in half and take half a tablet daily.  I want you to take the magnesium daily until I see you again.    2)  Since you are telling me that you are having episodes almost daily I am going to have you wear a monitor .  I am going to order this for 14 days, but if you feel like you have had a lot of the symptoms at the 7 day luis carlos then you can just send it in after 7 days.    3)  Please continue your cardiac medications as prescribed.    4) I want you to keep a log of your blood pressures and heart rates for 5 days take it in am and then again later in the day.  Bring the log to your follow up appointment.     Follow up with me after testing   If you have any questions, please call [phone] and choose option for Dr. Joseph's nurse Dina Quigley

## 2024-09-10 NOTE — PROGRESS NOTES
Referred by Dr. Joiner for No chief complaint on file.     History Of Present Illness:    Roxanna Hargrove is a 29 y.o. female presenting for palpitations   hx of anxiety disorder with C/O palpitations with feeling of Heart racing on and off associated with some dizziness.     Tells me she has seen Dr. Baeza years ago due to tachycardia/palpitations.  Recently she has random episodes of dizzy spells and sensation that her heart is skipping beats.    Tells me that her dizzy spells seem to occur randomly - she feels lightheaded and off-balance.  She tries to take some deep breaths and it seems to resolve  after a minute or two.  Works as a medical assistant.  In a week's time, it has occurred ~ 3-4 times per week.    No overt syncope.   The skipping heart beats - similar to her symptoms in 2020.  Only new medicine was folic acid, however starting this does not coincide with the symptoms.    She has been on Adderall every other day for about one year - she alternates the doses of 5mg and 20mg every other day - she feels as if her symptoms do not really correlate with when she takes the medicine or when she takes the higher dose adderall.  She does take nebivolol for the last 5 years for the tachycardia.  She was started on metoprolol but she did not tolerate this (does not recall the symptom), and she was initiated on nebivolol.    Fhx:  paternal grandfather with enlarged aortic root, mom and dad with high BP  SocHx:  vape (nicotine), no EtOH, no illicits    ROS:  The remainder of the review of systems was obtained, as was negative as pertains to the chief complaint.    CV testing and labs:   7/2023  TSH   1.00  4/2023  K 4.9  BUN 9  crea 0.5  TTE 8/2020  EF 61% bubble study is negative, trace mitral , tricuspid regurg,   Monitor worn from 7/17-8/15/2020 max heart rate 145  min 50 av 93.  Predominant sinus rhythm.  Occasional supraventricular ectopy.  Symptoms were associated with sinus rhythm sinus tach.   lipid labs  5/2021 chol 225  HDL 47.1    trig 124    Past Medical History:  She has a past medical history of 30 weeks gestation of pregnancy ([facility]) (10/09/2019), 33 weeks gestation of pregnancy ([facility]) (10/31/2019), Anxiety disorder, unspecified, Choroid plexus cyst of fetus in [place] pregnancy ([facility]) (10/31/2023), Gestational diabetes mellitus in pregnancy, diet controlled ([facility]) (11/27/2019), Hyperemesis gravidarum ([facility]) (10/31/2023), Kidney stone on right side (06/15/2023), Mass of lower outer quadrant of right breast (06/15/2023), Personal history of gestational diabetes (05/14/2020), Personal history of other complications of pregnancy, childbirth and the puerperium (02/04/2020), Personal history of other diseases of the respiratory system (02/18/2021), Personal history of other infectious and parasitic diseases (11/15/2021), Personal history of urinary calculi, and Pyelectasis of fetus on prenatal ultrasound ([facility]) (10/31/2023).    Past Surgical History:  She has a past surgical history that includes Other surgical history (01/20/2020) and Other surgical history (12/23/2019).      Social History:  She reports that she has never smoked. She has never used smokeless tobacco. She reports current alcohol use. She reports that she does not use drugs.    Family History:  No family history on file.     Allergies:  Iodinated contrast media, Amoxicillin-pot clavulanate, Cefaclor, Codeine, Hydrocodone-acetaminophen, Iodine, Povidone-iodine, Shellfish derived, Bupropion, and Wellbutrin [bupropion hcl]    Outpatient Medications:  Current Outpatient Medications   Medication Instructions    albuterol 90 mcg/actuation aerosol powdr breath activated inhaler 2 puffs, inhalation, Every 6 hours PRN    amphetamine-dextroamphetamine (Adderall) 5 mg tablet 5 mg, oral, Daily, Take in the afternoon.    amphetamine-dextroamphetamine XR (Adderall XR) 20 mg 24 hr capsule 20 mg, oral, Every morning, Do not crush or chew.     fluconazole (Diflucan) 150 mg tablet Take 1 tablet once. May repeat dose in 1 week if needed    folic acid (FOLVITE) 1 mg, oral, Daily    montelukast (SINGULAIR) 10 mg, oral, Nightly    nebivolol (BYSTOLIC) 5 mg, oral, Daily        Last Recorded Vitals:  Vitals:    09/10/24 1418   BP: 116/80   Pulse: 85   Weight: 61.2 kg (135 lb)   Height: 1.524 m (5')       Physical Exam:  Physical Exam  HENT:      Head: Normocephalic.      Nose: Nose normal.      Mouth/Throat:      Mouth: Mucous membranes are moist.   Cardiovascular:      Rate and Rhythm: Normal rate and regular rhythm.      Comments: No bruits   Pulmonary:      Effort: Pulmonary effort is normal.      Breath sounds: Normal breath sounds.   Abdominal:      Palpations: Abdomen is soft.   Musculoskeletal:         General: Normal range of motion.      Cervical back: Normal range of motion.   Skin:     General: Skin is warm and dry.   Neurological:      General: No focal deficit present.      Mental Status: She is alert.   Psychiatric:         Mood and Affect: Mood normal.              Last Labs:  CBC -  Lab Results   Component Value Date    WBC 8.9 07/28/2023    HGB 13.0 07/28/2023    HCT 41.3 07/28/2023    MCV 86 07/28/2023     07/28/2023       CMP -  Lab Results   Component Value Date    CALCIUM 9.3 05/15/2021    PROT 7.4 05/15/2021    ALBUMIN 4.1 05/15/2021    AST 16 05/15/2021    ALT 14 05/15/2021    ALKPHOS 79 05/15/2021    BILITOT 0.5 05/15/2021       LIPID PANEL -   Lab Results   Component Value Date    CHOL 225 (H) 05/15/2021    TRIG 124 05/15/2021    HDL 47.1 05/15/2021    CHHDL 4.8 05/15/2021    LDLF 153 (H) 05/15/2021    VLDL 25 05/15/2021       RENAL FUNCTION PANEL -   Lab Results   Component Value Date    GLUCOSE 89 05/15/2021     05/15/2021    K 3.7 05/15/2021     05/15/2021    CO2 27 05/15/2021    ANIONGAP 11 05/15/2021    BUN 9 05/15/2021    CREATININE 0.54 05/15/2021    CALCIUM 9.3 05/15/2021    ALBUMIN 4.1 05/15/2021        Lab  Results   Component Value Date    BNP 54 12/15/2019    HGBA1C 5.5 02/06/2024               Assessment/Plan   Dizzy spells  Sensation of skipped heart beats  History of palpitations - 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  Of note, she has had 30 lb weight loss recently, which may make her more sensitive to medications.  I am recommending further eval as follows:    -5 day blood pressure/ heart rate log - twice daily measurements  -reduce nebivolol to half pill daily and switch to nighttime dosing  -check 14 day holter monitor to rule out masood-/tachy-arrhythmias  -to stay hydrated and try to minimize caffeine intake  -continue OTC magnesium supplementation

## 2024-09-11 ENCOUNTER — TELEPHONE (OUTPATIENT)
Dept: PRIMARY CARE | Facility: CLINIC | Age: 29
End: 2024-09-11
Payer: COMMERCIAL

## 2024-09-11 DIAGNOSIS — F90.2 ATTENTION DEFICIT HYPERACTIVITY DISORDER (ADHD), COMBINED TYPE: Primary | ICD-10-CM

## 2024-09-11 RX ORDER — DEXTROAMPHETAMINE SACCHARATE, AMPHETAMINE ASPARTATE MONOHYDRATE, DEXTROAMPHETAMINE SULFATE AND AMPHETAMINE SULFATE 2.5; 2.5; 2.5; 2.5 MG/1; MG/1; MG/1; MG/1
10 CAPSULE, EXTENDED RELEASE ORAL EVERY MORNING
Qty: 30 CAPSULE | Refills: 0 | Status: SHIPPED | OUTPATIENT
Start: 2024-09-11 | End: 2024-10-11

## 2024-09-11 NOTE — TELEPHONE ENCOUNTER
Pt called and is wondering if you would decrease her adderall dosage to 10mg? She is currently on 20mg, has had recent weight loss of 30lbs.

## 2024-09-17 ENCOUNTER — OFFICE VISIT (OUTPATIENT)
Dept: PRIMARY CARE | Facility: CLINIC | Age: 29
End: 2024-09-17
Payer: COMMERCIAL

## 2024-09-17 VITALS
OXYGEN SATURATION: 99 % | TEMPERATURE: 97.5 F | BODY MASS INDEX: 26.11 KG/M2 | HEIGHT: 60 IN | HEART RATE: 82 BPM | WEIGHT: 133 LBS | DIASTOLIC BLOOD PRESSURE: 68 MMHG | SYSTOLIC BLOOD PRESSURE: 102 MMHG

## 2024-09-17 DIAGNOSIS — M99.07 SEGMENTAL AND SOMATIC DYSFUNCTION OF UPPER EXTREMITY: ICD-10-CM

## 2024-09-17 DIAGNOSIS — M99.02 SEGMENTAL AND SOMATIC DYSFUNCTION OF THORACIC REGION: ICD-10-CM

## 2024-09-17 DIAGNOSIS — M99.01 SEGMENTAL AND SOMATIC DYSFUNCTION OF CERVICAL REGION: ICD-10-CM

## 2024-09-17 DIAGNOSIS — M99.08 SEGMENTAL AND SOMATIC DYSFUNCTION OF RIB CAGE: ICD-10-CM

## 2024-09-17 DIAGNOSIS — M99.00 SEGMENTAL AND SOMATIC DYSFUNCTION OF HEAD REGION: ICD-10-CM

## 2024-09-17 DIAGNOSIS — M54.6 DORSALGIA OF CERVICOTHORACIC REGION: Primary | ICD-10-CM

## 2024-09-17 DIAGNOSIS — M54.2 DORSALGIA OF CERVICOTHORACIC REGION: Primary | ICD-10-CM

## 2024-09-17 PROCEDURE — 3008F BODY MASS INDEX DOCD: CPT | Performed by: FAMILY MEDICINE

## 2024-09-17 PROCEDURE — 98927 OSTEOPATH MANJ 5-6 REGIONS: CPT | Performed by: FAMILY MEDICINE

## 2024-09-17 PROCEDURE — 99213 OFFICE O/P EST LOW 20 MIN: CPT | Performed by: FAMILY MEDICINE

## 2024-09-17 ASSESSMENT — ENCOUNTER SYMPTOMS: BACK PAIN: 1

## 2024-09-17 NOTE — PROGRESS NOTES
Subjective   Patient ID: Roxanna Hargrove is a 29 y.o. female who presents for upper back pain  Back Pain  This is a recurrent problem. The pain is present in the thoracic spine. The pain is moderate.       Review of Systems   Musculoskeletal:  Positive for back pain.       Objective   Physical Exam  General: Patient is alert and orient x3 and appears in no acute distress.  Some pain distress.    Neck: Decreased range of motion in all planes    Heart: Regular rate and rhythm no murmurs clicks or gallops    Lungs: Clear to auscultation bilaterally without rhonchi rales or wheezing      Musculoskeletal: Strength was grossly intact.  Deep tendon reflexes intact.  Sensation intact.  Decreased range of motion    Osteopathic structural:  In the head region there is increased suboccipital tension  In the cervical region C2 extended rotated right side bent left  In the rib region first rib on the left was tender to palpation resisted exhalation  In the upper extremity  right upper extremity was protracted inferior tension of pectoralis minor  In the thoracic region  T2 was extended rotated right side bent right, T7 flexed rotated right side bent right        Assessment/Plan   Problem List Items Addressed This Visit    None  Visit Diagnoses       Dorsalgia of cervicothoracic region    -  Primary    Segmental and somatic dysfunction of cervical region        Segmental and somatic dysfunction of head region        Segmental and somatic dysfunction of rib cage        Segmental and somatic dysfunction of thoracic region        Segmental and somatic dysfunction of upper extremity                    Osteopathic manipulative therapy was used  In the head region as suboccipital release  In the cervical region as functional positional release  In the upper extremity as muscle energy  In the rib region as functional positional release  In the Thoracics as high velocity low amplitude thrust and muscle energy      Patient tolerated the  procedure well and noticed improvement after the treatment.

## 2024-09-18 DIAGNOSIS — J45.30 MILD PERSISTENT ASTHMA WITHOUT COMPLICATION (HHS-HCC): ICD-10-CM

## 2024-09-18 RX ORDER — MONTELUKAST SODIUM 10 MG/1
10 TABLET ORAL NIGHTLY
Qty: 90 TABLET | Refills: 3 | Status: SHIPPED | OUTPATIENT
Start: 2024-09-18

## 2024-10-07 LAB — BODY SURFACE AREA: 1.61 M2

## 2024-10-08 ENCOUNTER — OFFICE VISIT (OUTPATIENT)
Dept: CARDIOLOGY | Facility: HOSPITAL | Age: 29
End: 2024-10-08
Payer: COMMERCIAL

## 2024-10-08 VITALS
SYSTOLIC BLOOD PRESSURE: 114 MMHG | HEIGHT: 60 IN | WEIGHT: 133 LBS | BODY MASS INDEX: 26.11 KG/M2 | HEART RATE: 75 BPM | DIASTOLIC BLOOD PRESSURE: 62 MMHG

## 2024-10-08 DIAGNOSIS — R42 DIZZINESS: ICD-10-CM

## 2024-10-08 DIAGNOSIS — R00.2 HEART PALPITATIONS: Primary | ICD-10-CM

## 2024-10-08 DIAGNOSIS — E78.2 MIXED HYPERLIPIDEMIA: ICD-10-CM

## 2024-10-08 PROCEDURE — 99214 OFFICE O/P EST MOD 30 MIN: CPT | Performed by: INTERNAL MEDICINE

## 2024-10-08 PROCEDURE — 3008F BODY MASS INDEX DOCD: CPT | Performed by: INTERNAL MEDICINE

## 2024-10-08 PROCEDURE — 1036F TOBACCO NON-USER: CPT | Performed by: INTERNAL MEDICINE

## 2024-10-08 RX ORDER — PROPRANOLOL HYDROCHLORIDE 10 MG/1
5 TABLET ORAL 2 TIMES DAILY
Qty: 30 TABLET | Refills: 11 | Status: SHIPPED | OUTPATIENT
Start: 2024-10-08 | End: 2024-10-11 | Stop reason: SINTOL

## 2024-10-08 NOTE — PATIENT INSTRUCTIONS
Thanks for following up in office today.    1)  I am going to switch you from bystolic  to propranolol 10 mg.  Take half in the morning and the other half around 1 pm.     2)  Monitor your blood pressures and heart rates for two weeks and keep a log.    3)  Please continue your cardiac medications as prescribed.    Follow up with me as a 3 week phone visit with me.  If you have any questions, please call [phone] and choose option for Dr. Joseph's nurse Dina Quigley

## 2024-10-08 NOTE — PROGRESS NOTES
Chief Complaint:   No chief complaint on file.     History Of Present Illness:    Roxanna Hargrove is a 29 y.o. female presenting follow up after testing .  Hx of anxiety disorder with C/O palpitations with feeling of Heart racing on and off associated with some dizziness.     Telling me she overall feels better with regards to dizziness.  Has been taking OTC Mg supplements daily, and of note her Adderall dose was dropped from 20mg > 10mg daily.  She did try to half her nebivolol dose, but when she did this, found she was having more palps and had to take an additional half a pill later in the day.  Staying hydrated, drinking one cup of coffee or less daily.  We reviewed her holter below, symptoms with SR/ST HR as high as 109bpm and with SR with PVC's.  Rare PAC/PVC's.    CV testing and labs:   Monitor worn from 9/10-17/24.  Min heart rate 48, max 142  av 90.  Predominant underlying rhythm was SR.  Ve's, SVE's <1%.  7/2023  TSH   1.00  4/2023  K 4.9  BUN 9  crea 0.5  TTE 8/2020  EF 61% bubble study is negative, trace mitral , tricuspid regurg,   Monitor worn from 7/17-8/15/2020 max heart rate 145  min 50 av 93.  Predominant sinus rhythm.  Occasional supraventricular ectopy.  Symptoms were associated with sinus rhythm sinus tach.   lipid labs 5/2021 chol 225  HDL 47.1    trig 124    Last Recorded Vitals:  Vitals:    10/08/24 1437   BP: 114/62   Pulse: 75   Weight: 60.3 kg (133 lb)   Height: 1.524 m (5')       Past Medical History:  She has a past medical history of 30 weeks gestation of pregnancy ([facility]) (10/09/2019), 33 weeks gestation of pregnancy ([facility]) (10/31/2019), Anxiety disorder, unspecified, Choroid plexus cyst of fetus in [place] pregnancy (10/31/2023), Gestational diabetes mellitus in pregnancy, diet controlled ([facility]) (11/27/2019), Hyperemesis gravidarum ([facility]) (10/31/2023), Kidney stone on right side (06/15/2023), Mass of lower outer quadrant of right breast (06/15/2023), Personal  history of gestational diabetes (05/14/2020), Personal history of other complications of pregnancy, childbirth and the puerperium (02/04/2020), Personal history of other diseases of the respiratory system (02/18/2021), Personal history of other infectious and parasitic diseases (11/15/2021), Personal history of urinary calculi, and Pyelectasis of fetus on prenatal ultrasound ([facility]) (10/31/2023).    Past Surgical History:  She has a past surgical history that includes Other surgical history (01/20/2020) and Other surgical history (12/23/2019).      Social History:  She reports that she has never smoked. She has never used smokeless tobacco. She reports current alcohol use. She reports that she does not use drugs.    Family History:  No family history on file.     Allergies:  Iodinated contrast media, Amoxicillin-pot clavulanate, Cefaclor, Codeine, Hydrocodone-acetaminophen, Iodine, Povidone-iodine, Shellfish derived, Bupropion, and Wellbutrin [bupropion hcl]    Outpatient Medications:  Current Outpatient Medications   Medication Instructions    albuterol 90 mcg/actuation aerosol powdr breath activated inhaler 2 puffs, inhalation, Every 6 hours PRN    amphetamine-dextroamphetamine (Adderall) 5 mg tablet 5 mg, oral, Daily, Take in the afternoon.    amphetamine-dextroamphetamine XR (Adderall XR) 10 mg 24 hr capsule 10 mg, oral, Every morning, Do not crush or chew.    folic acid (FOLVITE) 1 mg, oral, Daily    montelukast (SINGULAIR) 10 mg, oral, Nightly    nebivolol (BYSTOLIC) 5 mg, oral, Daily       Physical Exam:  Physical Exam  HENT:      Head: Normocephalic.      Nose: Nose normal.      Mouth/Throat:      Mouth: Mucous membranes are moist.   Cardiovascular:      Rate and Rhythm: Normal rate and regular rhythm.   Pulmonary:      Effort: Pulmonary effort is normal.      Breath sounds: Normal breath sounds.   Abdominal:      Palpations: Abdomen is soft.   Musculoskeletal:         General: Normal range of motion.       Cervical back: Normal range of motion.   Skin:     General: Skin is warm and dry.   Neurological:      General: No focal deficit present.      Mental Status: She is alert.   Psychiatric:         Mood and Affect: Mood normal.            Last Labs:  CBC -  Lab Results   Component Value Date    WBC 8.9 07/28/2023    HGB 13.0 07/28/2023    HCT 41.3 07/28/2023    MCV 86 07/28/2023     07/28/2023       CMP -  Lab Results   Component Value Date    CALCIUM 9.3 05/15/2021    PROT 7.4 05/15/2021    ALBUMIN 4.1 05/15/2021    AST 16 05/15/2021    ALT 14 05/15/2021    ALKPHOS 79 05/15/2021    BILITOT 0.5 05/15/2021       LIPID PANEL -   Lab Results   Component Value Date    CHOL 225 (H) 05/15/2021    TRIG 124 05/15/2021    HDL 47.1 05/15/2021    CHHDL 4.8 05/15/2021    LDLF 153 (H) 05/15/2021    VLDL 25 05/15/2021       RENAL FUNCTION PANEL -   Lab Results   Component Value Date    GLUCOSE 89 05/15/2021     05/15/2021    K 3.7 05/15/2021     05/15/2021    CO2 27 05/15/2021    ANIONGAP 11 05/15/2021    BUN 9 05/15/2021    CREATININE 0.54 05/15/2021    CALCIUM 9.3 05/15/2021    ALBUMIN 4.1 05/15/2021        Lab Results   Component Value Date    BNP 54 12/15/2019    HGBA1C 5.5 02/06/2024           Assessment/Plan   Dizzy spells  Sensation of skipped heart beats  History of palpitations - thought 2/2 inappropriate sinus tach - treated in the past with inderal and metoprolol and did not tolerate and now nebivolol (had her tubes removed)     Unclear definite etiology.  Of note, she has had 30 lb weight loss recently, which may make her more sensitive to medications.  I am recommending further eval as follows:    -stop nebivolol  -start propranolol 10mg, half pill in AM and half pill at ~1PM  -to stay hydrated and try to minimize caffeine intake  -continue OTC magnesium supplementation  -phone visit with me in 3 weeks

## 2024-10-11 ENCOUNTER — TELEPHONE (OUTPATIENT)
Dept: CARDIOLOGY | Facility: HOSPITAL | Age: 29
End: 2024-10-11
Payer: COMMERCIAL

## 2024-10-11 NOTE — TELEPHONE ENCOUNTER
Pt called in and expressed that the new medication she started on 10/8/24. It is causing her legs, feet, and hands swell. Please call patient.    Rachael ROBLES

## 2024-10-11 NOTE — TELEPHONE ENCOUNTER
Reviewed with Dr Joseph. Called patient back and told her that this is not typically a side effect of this medication.  She then tells me that the swelling is less today since she only took one dose of the med.  She tells me that the swelling is only in her right hand and foot and her foot is tender with it is swollen and she walks on it and gets purple in color. Updated Dr Joseph and told her to stop the propranolol and go back to taking the bystolic.

## 2024-10-17 ENCOUNTER — CLINICAL SUPPORT (OUTPATIENT)
Dept: PRIMARY CARE | Facility: CLINIC | Age: 29
End: 2024-10-17
Payer: COMMERCIAL

## 2024-10-17 DIAGNOSIS — Z11.3 ROUTINE SCREENING FOR STI (SEXUALLY TRANSMITTED INFECTION): Primary | ICD-10-CM

## 2024-10-17 DIAGNOSIS — Z20.2 POSSIBLE EXPOSURE TO STD: ICD-10-CM

## 2024-10-17 DIAGNOSIS — R23.8 VESICLE OF SKIN: ICD-10-CM

## 2024-10-17 PROCEDURE — 99211 OFF/OP EST MAY X REQ PHY/QHP: CPT | Performed by: FAMILY MEDICINE

## 2024-10-17 NOTE — Clinical Note
Not sure what orders you want in for Roxanna, please enter them, also, not sure sure how to bill this. (What dx to use)

## 2024-11-06 ENCOUNTER — APPOINTMENT (OUTPATIENT)
Dept: PRIMARY CARE | Facility: CLINIC | Age: 29
End: 2024-11-06
Payer: COMMERCIAL

## 2024-11-07 ENCOUNTER — APPOINTMENT (OUTPATIENT)
Dept: PRIMARY CARE | Facility: CLINIC | Age: 29
End: 2024-11-07
Payer: COMMERCIAL

## 2024-11-11 ENCOUNTER — APPOINTMENT (OUTPATIENT)
Dept: PRIMARY CARE | Facility: CLINIC | Age: 29
End: 2024-11-11
Payer: COMMERCIAL

## 2024-11-11 VITALS
SYSTOLIC BLOOD PRESSURE: 100 MMHG | WEIGHT: 130 LBS | HEIGHT: 60 IN | RESPIRATION RATE: 16 BRPM | BODY MASS INDEX: 25.52 KG/M2 | DIASTOLIC BLOOD PRESSURE: 70 MMHG | HEART RATE: 91 BPM | OXYGEN SATURATION: 100 %

## 2024-11-11 DIAGNOSIS — F32.1 MODERATE MAJOR DEPRESSION (MULTI): ICD-10-CM

## 2024-11-11 DIAGNOSIS — E78.2 MIXED HYPERLIPIDEMIA: ICD-10-CM

## 2024-11-11 DIAGNOSIS — Z51.81 THERAPEUTIC DRUG MONITORING: Primary | ICD-10-CM

## 2024-11-11 DIAGNOSIS — R00.2 HEART PALPITATIONS: ICD-10-CM

## 2024-11-11 DIAGNOSIS — F90.2 ATTENTION DEFICIT HYPERACTIVITY DISORDER (ADHD), COMBINED TYPE: ICD-10-CM

## 2024-11-11 DIAGNOSIS — J45.30 MILD PERSISTENT ASTHMA WITHOUT COMPLICATION (HHS-HCC): ICD-10-CM

## 2024-11-11 DIAGNOSIS — F41.1 GENERALIZED ANXIETY DISORDER: ICD-10-CM

## 2024-11-11 DIAGNOSIS — R73.03 PREDIABETES: ICD-10-CM

## 2024-11-11 DIAGNOSIS — E66.3 OVERWEIGHT WITH BODY MASS INDEX (BMI) OF 25 TO 25.9 IN ADULT: ICD-10-CM

## 2024-11-11 PROCEDURE — 80359 METHYLENEDIOXYAMPHETAMINES: CPT

## 2024-11-11 PROCEDURE — 1036F TOBACCO NON-USER: CPT | Performed by: FAMILY MEDICINE

## 2024-11-11 PROCEDURE — 3008F BODY MASS INDEX DOCD: CPT | Performed by: FAMILY MEDICINE

## 2024-11-11 PROCEDURE — 99213 OFFICE O/P EST LOW 20 MIN: CPT | Performed by: FAMILY MEDICINE

## 2024-11-11 RX ORDER — DEXTROAMPHETAMINE SACCHARATE, AMPHETAMINE ASPARTATE MONOHYDRATE, DEXTROAMPHETAMINE SULFATE AND AMPHETAMINE SULFATE 1.25; 1.25; 1.25; 1.25 MG/1; MG/1; MG/1; MG/1
5 CAPSULE, EXTENDED RELEASE ORAL EVERY MORNING
Qty: 30 CAPSULE | Refills: 0 | Status: SHIPPED | OUTPATIENT
Start: 2024-11-11 | End: 2024-12-11

## 2024-11-11 ASSESSMENT — ENCOUNTER SYMPTOMS
CHEST TIGHTNESS: 0
UNEXPECTED WEIGHT CHANGE: 0
DYSURIA: 0
POLYDIPSIA: 0
WHEEZING: 0
FREQUENCY: 0
SHORTNESS OF BREATH: 0
LIGHT-HEADEDNESS: 0
CONSTIPATION: 0
HEMATURIA: 0
NERVOUS/ANXIOUS: 0
COUGH: 0
DYSPHORIC MOOD: 0
SINUS PAIN: 0
VOMITING: 0
NAUSEA: 0
PALPITATIONS: 0
FEVER: 0
DIAPHORESIS: 0
SORE THROAT: 0
SINUS PRESSURE: 0
HEADACHES: 0
DIZZINESS: 0
NUMBNESS: 0
ADENOPATHY: 0
CHILLS: 0
DIARRHEA: 0
ABDOMINAL PAIN: 0
POLYPHAGIA: 0
CONFUSION: 0

## 2024-11-11 NOTE — PATIENT INSTRUCTIONS
Roaxnna Hargrove ,    Thank you for coming in today. We at [facility] appreciate your trust in our care. If you have any questions or concerns about the care you received today, please do not hesitate to contact us at [phone].    The following instructions were discussed today:    - Follow up in 3 months.

## 2024-11-11 NOTE — PROGRESS NOTES
Subjective   Patient ID: Roxanna Hargrove is a 29 y.o. female who presents for UDS/CSA to be done today  (Wants to discuss dosage change for adderall/Flu vaccine received at an earlier time, refusing Prev 20 vaccine).    HPI   routine follow up. chronic issues as per assessment and plan.     She has ADHD and is on adderall. She would like to decrease dose of extended release to 5 mg. Last time she took the adderall was about 10 days ago. It is causing increased heart rate at current dose.     OARRS:  Rose Joiner MD on 11/11/2024  9:45 AM  I have personally reviewed the OARRS report for Roxanna Hargrove. I have considered the risks of abuse, dependence, addiction and diversion and I believe that it is clinically appropriate for Roxanna Hargrove to be prescribed this medication    Is the patient prescribed a combination of a benzodiazepine and opioid?  No    Last Urine Drug Screen / ordered today: Yes  No results found for this or any previous visit (from the past 8760 hours).  N/A        Controlled Substance Agreement: Stimulants   Date of the Last Agreement: 11/11/24   Reviewed Controlled Substance Agreement including but not limited to the benefits, risks, and alternatives to treatment with a Controlled Substance medication(s).    Stimulants:   What is the patient's goal of therapy? Improve focus and concentration  Is this being achieved with current treatment? yes    Activities of Daily Living:   Is your overall impression that this patient is benefiting (symptom reduction outweighs side effects) from stimulant therapy? Yes     1. Physical Functioning: Better  2. Family Relationship: Better  3. Social Relationship: Better  4. Mood: Better  5. Sleep Patterns: Better  6. Overall Function: Better      Review of Systems   Constitutional:  Negative for chills, diaphoresis, fever and unexpected weight change.   HENT:  Negative for congestion, sinus pressure, sinus pain, sneezing and sore throat.    Respiratory:  Negative  for cough, chest tightness, shortness of breath and wheezing.    Cardiovascular:  Negative for chest pain, palpitations and leg swelling.   Gastrointestinal:  Negative for abdominal pain, constipation, diarrhea, nausea and vomiting.   Endocrine: Negative for cold intolerance, heat intolerance, polydipsia, polyphagia and polyuria.   Genitourinary:  Negative for dysuria, frequency, hematuria and urgency.   Neurological:  Negative for dizziness, syncope, light-headedness, numbness and headaches.   Hematological:  Negative for adenopathy.   Psychiatric/Behavioral:  Negative for confusion and dysphoric mood. The patient is not nervous/anxious.        Objective   /70 (BP Location: Right arm, Patient Position: Sitting, BP Cuff Size: Adult)   Pulse 91   Resp 16   Ht 1.524 m (5')   Wt 59 kg (130 lb)   SpO2 100%   BMI 25.39 kg/m²     Physical Exam  Vitals and nursing note reviewed.   Constitutional:       General: She is not in acute distress.     Appearance: Normal appearance.   HENT:      Head: Normocephalic and atraumatic.      Nose: Nose normal.   Eyes:      Extraocular Movements: Extraocular movements intact.      Conjunctiva/sclera: Conjunctivae normal.      Pupils: Pupils are equal, round, and reactive to light.   Cardiovascular:      Rate and Rhythm: Normal rate and regular rhythm.      Heart sounds: No murmur heard.     No friction rub. No gallop.   Pulmonary:      Effort: Pulmonary effort is normal.      Breath sounds: Normal breath sounds. No wheezing, rhonchi or rales.   Abdominal:      General: Bowel sounds are normal. There is no distension.      Palpations: Abdomen is soft.      Tenderness: There is no abdominal tenderness.   Musculoskeletal:         General: Normal range of motion.      Cervical back: Normal range of motion and neck supple.   Skin:     General: Skin is warm and dry.   Neurological:      General: No focal deficit present.      Mental Status: She is alert and oriented to person,  place, and time.      Deep Tendon Reflexes: Reflexes normal.   Psychiatric:         Mood and Affect: Mood normal.         Behavior: Behavior normal.         Thought Content: Thought content normal.         Judgment: Judgment normal.         Assessment/Plan   Problem List Items Addressed This Visit             ICD-10-CM    Attention deficit hyperactivity disorder (ADHD), combined type F90.2     - controlled with adderall but getting increased heart rate on the 10 mg extended release. Will decrease to 5 mg          Relevant Medications    amphetamine-dextroamphetamine XR (Adderall XR) 5 mg 24 hr capsule    BMI 25.0-25.9,adult Z68.25     - Encouraged healthy lifestyle, including adequate exercise and high fiber, low fat and low carb diet.          Generalized anxiety disorder F41.1     - controlled off medication. Monitor.          Heart palpitations R00.2     - continue bystolic. Has seen cardiology in the past          Mild persistent asthma without complication ([facility]) J45.30     - continue albuterol as needed          Mixed hyperlipidemia E78.2     - Encouraged healthy lifestyle, including adequate exercise and high fiber, low fat and low carb diet.            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 Monitor.          Overweight with body mass index (BMI) of 25 to 25.9 in adult E66.3, Z68.25     - Encouraged healthy lifestyle, including adequate exercise and high fiber, low fat and low carb diet.          Prediabetes R73.03     - Encouraged healthy lifestyle, including adequate exercise and high fiber, low fat and low carb diet.             Other Visit Diagnoses         Codes    Therapeutic drug monitoring    -  Primary Z51.81    Relevant Orders    Amphetamine Confirm, Urine    Drug Screen, Urine With Reflex to Confirmation

## 2024-11-11 NOTE — ASSESSMENT & PLAN NOTE
- controlled with adderall but getting increased heart rate on the 10 mg extended release. Will decrease to 5 mg

## 2024-11-11 NOTE — ASSESSMENT & PLAN NOTE
- Encouraged healthy lifestyle, including adequate exercise and high fiber, low fat and low carb diet.

## 2024-11-12 LAB
AMPHETAMINES UR QL SCN: NORMAL
BARBITURATES UR QL SCN: NORMAL
BENZODIAZ UR QL SCN: NORMAL
BZE UR QL SCN: NORMAL
CANNABINOIDS UR QL SCN: NORMAL
FENTANYL+NORFENTANYL UR QL SCN: NORMAL
METHADONE UR QL SCN: NORMAL
OPIATES UR QL SCN: NORMAL
OXYCODONE+OXYMORPHONE UR QL SCN: NORMAL
PCP UR QL SCN: NORMAL

## 2024-11-15 LAB
AMPHET UR-MCNC: <50 NG/ML
MDA UR-MCNC: <200 NG/ML
MDEA UR-MCNC: <200 NG/ML
MDMA UR-MCNC: <200 NG/ML
METHAMPHET UR-MCNC: <200 NG/ML
PHENTERMINE UR CFM-MCNC: <200 NG/ML

## 2025-01-07 ENCOUNTER — TELEPHONE (OUTPATIENT)
Dept: PRIMARY CARE | Facility: CLINIC | Age: 30
End: 2025-01-07
Payer: COMMERCIAL

## 2025-01-07 DIAGNOSIS — J01.90 ACUTE SINUSITIS, RECURRENCE NOT SPECIFIED, UNSPECIFIED LOCATION: Primary | ICD-10-CM

## 2025-01-07 RX ORDER — AZITHROMYCIN 250 MG/1
TABLET, FILM COATED ORAL
Qty: 6 TABLET | Refills: 0 | Status: SHIPPED | OUTPATIENT
Start: 2025-01-07 | End: 2025-01-11

## 2025-01-07 NOTE — TELEPHONE ENCOUNTER
Pt would like something to help with a possible sinus infection.  [facility] [place]     allergy to Augmentin

## 2025-01-27 ENCOUNTER — APPOINTMENT (OUTPATIENT)
Dept: PRIMARY CARE | Facility: CLINIC | Age: 30
End: 2025-01-27
Payer: COMMERCIAL

## 2025-01-27 VITALS
WEIGHT: 132 LBS | TEMPERATURE: 97.6 F | OXYGEN SATURATION: 99 % | HEART RATE: 78 BPM | SYSTOLIC BLOOD PRESSURE: 106 MMHG | HEIGHT: 60 IN | BODY MASS INDEX: 25.91 KG/M2 | DIASTOLIC BLOOD PRESSURE: 66 MMHG

## 2025-01-27 DIAGNOSIS — M99.00 SEGMENTAL AND SOMATIC DYSFUNCTION OF HEAD REGION: ICD-10-CM

## 2025-01-27 DIAGNOSIS — M99.07 SEGMENTAL AND SOMATIC DYSFUNCTION OF UPPER EXTREMITY: ICD-10-CM

## 2025-01-27 DIAGNOSIS — M54.6 DORSALGIA OF CERVICOTHORACIC REGION: Primary | ICD-10-CM

## 2025-01-27 DIAGNOSIS — M54.2 DORSALGIA OF CERVICOTHORACIC REGION: Primary | ICD-10-CM

## 2025-01-27 DIAGNOSIS — M99.08 SEGMENTAL AND SOMATIC DYSFUNCTION OF RIB CAGE: ICD-10-CM

## 2025-01-27 DIAGNOSIS — M99.02 SEGMENTAL AND SOMATIC DYSFUNCTION OF THORACIC REGION: ICD-10-CM

## 2025-01-27 DIAGNOSIS — M99.01 SEGMENTAL AND SOMATIC DYSFUNCTION OF CERVICAL REGION: ICD-10-CM

## 2025-01-27 PROCEDURE — 98927 OSTEOPATH MANJ 5-6 REGIONS: CPT | Performed by: FAMILY MEDICINE

## 2025-01-27 PROCEDURE — 99213 OFFICE O/P EST LOW 20 MIN: CPT | Performed by: FAMILY MEDICINE

## 2025-01-27 ASSESSMENT — ENCOUNTER SYMPTOMS: BACK PAIN: 1

## 2025-02-04 ENCOUNTER — CLINICAL SUPPORT (OUTPATIENT)
Dept: PRIMARY CARE | Facility: CLINIC | Age: 30
End: 2025-02-04
Payer: COMMERCIAL

## 2025-02-04 DIAGNOSIS — N30.01 ACUTE CYSTITIS WITH HEMATURIA: Primary | ICD-10-CM

## 2025-02-04 DIAGNOSIS — R10.9 ABDOMINAL PRESSURE: ICD-10-CM

## 2025-02-04 DIAGNOSIS — R35.0 URINE FREQUENCY: ICD-10-CM

## 2025-02-04 DIAGNOSIS — R35.0 URINE FREQUENCY: Primary | ICD-10-CM

## 2025-02-04 LAB
POC APPEARANCE, URINE: CLEAR
POC BILIRUBIN, URINE: ABNORMAL
POC BLOOD, URINE: NEGATIVE
POC COLOR, URINE: YELLOW
POC GLUCOSE, URINE: NEGATIVE MG/DL
POC KETONES, URINE: ABNORMAL MG/DL
POC LEUKOCYTES, URINE: ABNORMAL
POC NITRITE,URINE: NEGATIVE
POC PH, URINE: 5.5 PH
POC PROTEIN, URINE: ABNORMAL MG/DL
POC SPECIFIC GRAVITY, URINE: >=1.03
POC UROBILINOGEN, URINE: 0.2 EU/DL

## 2025-02-04 PROCEDURE — 99211 OFF/OP EST MAY X REQ PHY/QHP: CPT | Performed by: FAMILY MEDICINE

## 2025-02-04 PROCEDURE — 81003 URINALYSIS AUTO W/O SCOPE: CPT | Performed by: FAMILY MEDICINE

## 2025-02-04 RX ORDER — NITROFURANTOIN 25; 75 MG/1; MG/1
100 CAPSULE ORAL 2 TIMES DAILY
Qty: 10 CAPSULE | Refills: 0 | Status: SHIPPED | OUTPATIENT
Start: 2025-02-04 | End: 2025-02-09

## 2025-02-07 LAB — BACTERIA UR CULT: ABNORMAL

## 2025-02-07 RX ORDER — CIPROFLOXACIN 500 MG/1
500 TABLET ORAL 2 TIMES DAILY
Qty: 6 TABLET | Refills: 0 | Status: SHIPPED | OUTPATIENT
Start: 2025-02-07 | End: 2025-02-10

## 2025-02-12 ENCOUNTER — TELEPHONE (OUTPATIENT)
Dept: PRIMARY CARE | Facility: CLINIC | Age: 30
End: 2025-02-12

## 2025-02-12 ENCOUNTER — APPOINTMENT (OUTPATIENT)
Dept: PRIMARY CARE | Facility: CLINIC | Age: 30
End: 2025-02-12
Payer: COMMERCIAL

## 2025-02-12 DIAGNOSIS — E66.3 OVERWEIGHT WITH BODY MASS INDEX (BMI) OF 25 TO 25.9 IN ADULT: ICD-10-CM

## 2025-02-12 DIAGNOSIS — F41.1 GENERALIZED ANXIETY DISORDER: ICD-10-CM

## 2025-02-12 DIAGNOSIS — F90.2 ATTENTION DEFICIT HYPERACTIVITY DISORDER (ADHD), COMBINED TYPE: Primary | ICD-10-CM

## 2025-02-12 DIAGNOSIS — F32.1 MODERATE MAJOR DEPRESSION (MULTI): ICD-10-CM

## 2025-02-12 DIAGNOSIS — J45.30 MILD PERSISTENT ASTHMA WITHOUT COMPLICATION (HHS-HCC): ICD-10-CM

## 2025-02-12 DIAGNOSIS — J30.1 SEASONAL ALLERGIC RHINITIS DUE TO POLLEN: ICD-10-CM

## 2025-02-12 DIAGNOSIS — E78.2 MIXED HYPERLIPIDEMIA: ICD-10-CM

## 2025-02-12 DIAGNOSIS — R73.03 PREDIABETES: ICD-10-CM

## 2025-02-12 RX ORDER — MONTELUKAST SODIUM 10 MG/1
10 TABLET ORAL NIGHTLY
Qty: 30 TABLET | Refills: 5 | Status: SHIPPED | OUTPATIENT
Start: 2025-02-12 | End: 2025-08-11

## 2025-02-12 RX ORDER — DEXTROAMPHETAMINE SACCHARATE, AMPHETAMINE ASPARTATE MONOHYDRATE, DEXTROAMPHETAMINE SULFATE AND AMPHETAMINE SULFATE 1.25; 1.25; 1.25; 1.25 MG/1; MG/1; MG/1; MG/1
5 CAPSULE, EXTENDED RELEASE ORAL EVERY MORNING
Qty: 30 CAPSULE | Refills: 0 | Status: SHIPPED | OUTPATIENT
Start: 2025-02-12 | End: 2025-03-14

## 2025-02-12 ASSESSMENT — ENCOUNTER SYMPTOMS
DYSURIA: 0
DIAPHORESIS: 0
DIARRHEA: 0
CONFUSION: 0
NERVOUS/ANXIOUS: 0
HEADACHES: 0
POLYDIPSIA: 0
NUMBNESS: 0
VOMITING: 0
SORE THROAT: 0
NAUSEA: 0
FREQUENCY: 0
UNEXPECTED WEIGHT CHANGE: 0
HEMATURIA: 0
DYSPHORIC MOOD: 0
COUGH: 0
LIGHT-HEADEDNESS: 0
SINUS PAIN: 0
ABDOMINAL PAIN: 0
SHORTNESS OF BREATH: 0
WHEEZING: 0
POLYPHAGIA: 0
CHILLS: 0
ADENOPATHY: 0
DIZZINESS: 0
SINUS PRESSURE: 0
CHEST TIGHTNESS: 0
CONSTIPATION: 0
PALPITATIONS: 0
FEVER: 0

## 2025-02-12 NOTE — TELEPHONE ENCOUNTER
Did virtual with patient. She needs 90 day controlled med follow up  
Patient scheduled for 90 day follow up, reminder printed and given to Roxanna.   
complains of pain/discomfort

## 2025-02-12 NOTE — PROGRESS NOTES
Subjective   Patient ID: Roxanna Hargrove is a 29 y.o. female who presents for Follow-up.    Virtual or Telephone Consent    A telephone visit (audio only) between the patient (at the originating site) and the provider (at the distant site) was utilized to provide this telehealth service.   Verbal consent was requested and obtained from Roxanna Hargrove on this date, 02/12/25 for a telehealth visit.      HPI  routine follow up. chronic issues as per assessment and plan.     OARRS:  Rose Joiner MD on 2/12/2025  9:21 AM  I have personally reviewed the OARRS report for Roxanna Hargrove. I have considered the risks of abuse, dependence, addiction and diversion and I believe that it is clinically appropriate for Roxanna Hargrove to be prescribed this medication    Is the patient prescribed a combination of a benzodiazepine and opioid?  No    Last Urine Drug Screen / ordered today: No  Recent Results (from the past 8760 hours)   Drug Screen, Urine With Reflex to Confirmation    Collection Time: 11/11/24 10:05 AM   Result Value Ref Range    Amphetamine Screen, Urine Presumptive Negative Presumptive Negative    Barbiturate Screen, Urine Presumptive Negative Presumptive Negative    Benzodiazepines Screen, Urine Presumptive Negative Presumptive Negative    Cannabinoid Screen, Urine Presumptive Negative Presumptive Negative    Cocaine Metabolite Screen, Urine Presumptive Negative Presumptive Negative    Fentanyl Screen, Urine Presumptive Negative Presumptive Negative    Opiate Screen, Urine Presumptive Negative Presumptive Negative    Oxycodone Screen, Urine Presumptive Negative Presumptive Negative    PCP Screen, Urine Presumptive Negative Presumptive Negative    Methadone Screen, Urine Presumptive Negative Presumptive Negative   Amphetamine Confirm, Urine    Collection Time: 11/11/24 10:05 AM   Result Value Ref Range    Methamphetamine Quant, Ur <200 ng/mL    MDA, Urine <200 ng/mL    MDEA, Urine <200 ng/mL    Phentermine,Urine  <200 ng/mL    Amphetamines,Urine <50 ng/mL    MDMA, Urine <200 ng/mL     Results are as expected.         Controlled Substance Agreement: Stimulants  Date of the Last Agreement: November 2024  Reviewed Controlled Substance Agreement including but not limited to the benefits, risks, and alternatives to treatment with a Controlled Substance medication(s).    Stimulants:   What is the patient's goal of therapy? Improve focus and concentration  Is this being achieved with current treatment? yes    Activities of Daily Living:   Is your overall impression that this patient is benefiting (symptom reduction outweighs side effects) from stimulant therapy? Yes     1. Physical Functioning: Better  2. Family Relationship: Better  3. Social Relationship: Better  4. Mood: Better  5. Sleep Patterns: Better  6. Overall Function: Better      Review of Systems   Constitutional:  Negative for chills, diaphoresis, fever and unexpected weight change.   HENT:  Negative for congestion, sinus pressure, sinus pain, sneezing and sore throat.    Respiratory:  Negative for cough, chest tightness, shortness of breath and wheezing.    Cardiovascular:  Negative for chest pain, palpitations and leg swelling.   Gastrointestinal:  Negative for abdominal pain, constipation, diarrhea, nausea and vomiting.   Endocrine: Negative for cold intolerance, heat intolerance, polydipsia, polyphagia and polyuria.   Genitourinary:  Negative for dysuria, frequency, hematuria and urgency.   Neurological:  Negative for dizziness, syncope, light-headedness, numbness and headaches.   Hematological:  Negative for adenopathy.   Psychiatric/Behavioral:  Negative for confusion and dysphoric mood. The patient is not nervous/anxious.          Assessment/Plan   Problem List Items Addressed This Visit       Attention deficit hyperactivity disorder (ADHD), combined type - Primary     - controlled. Continue adderall 5 mg daily          Relevant Medications     amphetamine-dextroamphetamine XR (Adderall XR) 5 mg 24 hr capsule    BMI 25.0-25.9,adult     - Encouraged healthy lifestyle, including adequate exercise and high fiber, low fat and low carb diet.          Generalized anxiety disorder     - controlled off medication. Monitor.          Mild persistent asthma without complication ([facility])     - continue albuterol as needed   - add singulair          Relevant Medications    montelukast (Singulair) 10 mg tablet    Mixed hyperlipidemia     - Encouraged healthy lifestyle, including adequate exercise and high fiber, low fat and low carb diet.            Moderate major depression (Multi)     - controlled off medication. Monitor.          Overweight with body mass index (BMI) of 25 to 25.9 in adult     - Encouraged healthy lifestyle, including adequate exercise and high fiber, low fat and low carb diet.          Prediabetes     - Encouraged healthy lifestyle, including adequate exercise and high fiber, low fat and low carb diet.            Seasonal allergic rhinitis due to pollen     - continue singulair          Relevant Medications    montelukast (Singulair) 10 mg tablet       This telephone visit lasted approximately 11 minutes.

## 2025-02-27 DIAGNOSIS — J45.21 MILD INTERMITTENT ASTHMA WITH ACUTE EXACERBATION (HHS-HCC): ICD-10-CM

## 2025-02-27 DIAGNOSIS — J45.30 MILD PERSISTENT ASTHMA WITHOUT COMPLICATION (HHS-HCC): Primary | ICD-10-CM

## 2025-02-27 PROCEDURE — RXMED WILLOW AMBULATORY MEDICATION CHARGE

## 2025-02-27 RX ORDER — ALBUTEROL SULFATE AND BUDESONIDE 90; 80 UG/1; UG/1
2 AEROSOL, METERED RESPIRATORY (INHALATION) EVERY 6 HOURS PRN
Qty: 10.7 G | Refills: 11 | Status: SHIPPED | OUTPATIENT
Start: 2025-02-27 | End: 2025-03-31

## 2025-03-01 ENCOUNTER — PHARMACY VISIT (OUTPATIENT)
Dept: PHARMACY | Facility: CLINIC | Age: 30
End: 2025-03-01
Payer: COMMERCIAL

## 2025-03-02 ENCOUNTER — TELEPHONE (OUTPATIENT)
Dept: PRIMARY CARE | Facility: CLINIC | Age: 30
End: 2025-03-02

## 2025-03-02 DIAGNOSIS — J01.90 ACUTE SINUSITIS, RECURRENCE NOT SPECIFIED, UNSPECIFIED LOCATION: Primary | ICD-10-CM

## 2025-03-02 RX ORDER — AZITHROMYCIN 250 MG/1
TABLET, FILM COATED ORAL
Qty: 6 TABLET | Refills: 0 | Status: SHIPPED | OUTPATIENT
Start: 2025-03-02 | End: 2025-04-03 | Stop reason: WASHOUT

## 2025-04-03 ENCOUNTER — APPOINTMENT (OUTPATIENT)
Dept: OBSTETRICS AND GYNECOLOGY | Facility: CLINIC | Age: 30
End: 2025-04-03
Payer: COMMERCIAL

## 2025-04-03 VITALS
WEIGHT: 138 LBS | DIASTOLIC BLOOD PRESSURE: 60 MMHG | BODY MASS INDEX: 26.06 KG/M2 | SYSTOLIC BLOOD PRESSURE: 112 MMHG | HEIGHT: 61 IN

## 2025-04-03 DIAGNOSIS — Z30.011 ENCOUNTER FOR INITIAL PRESCRIPTION OF CONTRACEPTIVE PILLS: ICD-10-CM

## 2025-04-03 DIAGNOSIS — Z11.3 SCREEN FOR STD (SEXUALLY TRANSMITTED DISEASE): ICD-10-CM

## 2025-04-03 DIAGNOSIS — Z11.51 SCREENING FOR HUMAN PAPILLOMAVIRUS (HPV): ICD-10-CM

## 2025-04-03 DIAGNOSIS — Z12.4 SCREENING FOR MALIGNANT NEOPLASM OF CERVIX: ICD-10-CM

## 2025-04-03 DIAGNOSIS — Z01.419 ENCOUNTER FOR WELL WOMAN EXAM WITH ROUTINE GYNECOLOGICAL EXAM: Primary | ICD-10-CM

## 2025-04-03 PROCEDURE — 87491 CHLMYD TRACH DNA AMP PROBE: CPT

## 2025-04-03 PROCEDURE — 99395 PREV VISIT EST AGE 18-39: CPT | Performed by: NURSE PRACTITIONER

## 2025-04-03 PROCEDURE — 87591 N.GONORRHOEAE DNA AMP PROB: CPT

## 2025-04-03 PROCEDURE — 3008F BODY MASS INDEX DOCD: CPT | Performed by: NURSE PRACTITIONER

## 2025-04-03 PROCEDURE — 1036F TOBACCO NON-USER: CPT | Performed by: NURSE PRACTITIONER

## 2025-04-03 PROCEDURE — 87624 HPV HI-RISK TYP POOLED RSLT: CPT

## 2025-04-03 RX ORDER — NORETHINDRONE ACETATE AND ETHINYL ESTRADIOL, ETHINYL ESTRADIOL AND FERROUS FUMARATE 1MG-10(24)
1 KIT ORAL DAILY
Qty: 28 TABLET | Refills: 11 | Status: SHIPPED | OUTPATIENT
Start: 2025-04-03 | End: 2026-04-03

## 2025-04-03 NOTE — PROGRESS NOTES
"     HPI:   Roxanna Hargrove is a 30 y.o. who presents today for her annual gynecologic exam with complaints    She has the following concerns; having some intermittent pelvic pain. She was seen in the ER about six months ago for abdominal pain.   Pain feels sharp/ stabbing. Rates it 8/10 pain.   Has a tubal for pregnancy prevention.   Requests STD testing today d/t change in partners.     GYN HISTORY:  Periods are regular every 28-30 days, lasting 4 days.   Dysmenorrhea:mild, occurring throughout menses. Cyclic symptoms include pelvic pain.   No intermenstrual bleeding, spotting, or discharge.    Current contraception: tubal ligation      Requests STD testing: yes.      PAP History   Last pap:   2023 Normal HPV Negative  History of abnormal pap: no  HPV vaccine: no  @paphx@    Health Screening  Family history of breast, uterine, ovarian or colon cancer: no   Breast cancer: mammogram - due at age 40   Colon cancer: due at age 45       The patient feels safe at home.         Review of Systems:   Constitutional: no fever and no chills.  Cardiovascular: no chest pain.   Respiratory: no shortness of breath.   Gastrointestinal: no nausea, no abdominal pain and no constipation  Genitourinary: no dysuria, no urinary incontinence, no vaginal dryness, no pelvic pain and no vaginal discharge.   Neurological: no headache.  Psychiatric: no anxiety and no depression.              Objective         /60   Ht 1.55 m (5' 1.02\")   Wt 62.6 kg (138 lb)   LMP 03/19/2025   BMI 26.05 kg/m²         Physical Exam:   Constitutional: Alert and in no acute distress. Well developed, well nourished.      Neck: No neck asymmetry. Supple. Thyroid not enlarged and there were no palpable thyroid nodules.      Cardiovascular: Heart rate and rhythm were normal, normal S1 and S2, no gallops, and no murmurs.      Pulmonary: No respiratory distress. Clear bilateral breath sounds.      Chest: Breasts: Normal appearance, no nipple discharge and no " skin changes. Palpation of breasts and axillae: No palpable mass and no axillary lymphadenopathy.      Abdomen: Soft nontender; no abdominal mass palpated. Normal bowel sounds. No organomegaly.      Genitourinary:   - External genitalia: Normal.   - Palpation of lymph nodes in groin: No inguinal lymphadenopathy.   - Bartholin's Urethral and Skenes Glands: Normal.   - Urethra: Normal.    -Bladder: Normal on palpation.   - Vagina: Normal.   - Cervix: Normal.   - Uterus: Normal. Right Adnexa/parametria: Normal. Left Adnexa/parametria: Normal.   - Perianal Area: Normal.      Skin: Normal skin color and pigmentation, normal skin turgor, and no rash     Psychiatric: Alert and oriented x 3. Affect normal to patient baseline. Mood: Appropriate.            Assessment/Plan       Diagnoses and all orders for this visit:  Encounter for well woman exam with routine gynecological exam  Here for well woman exam and with c/o pelvic pain. She is agreeable to start an ocp to help with her periods. If she continues to have pain on follow-up, will order pelvic ultrasound.   PAP and STD testing obtained.   Encounter for initial prescription of contraceptive pills  -     norethindrone-e.estradioL-iron (Lo Loestrin Fe) 1 mg-10 mcg (24)/10 mcg (2) tablet; Take 1 tablet by mouth once daily.  No hx of DVT, no hx of migraines. She does vape. Reviewed risks and benefits of ocp including risks of blood clots. She verbalizes understanding.   Screen for STD (sexually transmitted disease)  -     THINPREP PAP TEST (>30)  Screening for malignant neoplasm of cervix  -     THINPREP PAP TEST (>30)  Screening for human papillomavirus (HPV)  -     THINPREP PAP TEST (>30)  Follow-up in three months; sooner if needed.       BRAVO Hernandez-CNP

## 2025-04-05 LAB
C TRACH RRNA SPEC QL NAA+PROBE: NEGATIVE
N GONORRHOEA DNA SPEC QL PROBE+SIG AMP: NEGATIVE

## 2025-04-17 ENCOUNTER — TELEPHONE (OUTPATIENT)
Dept: OBSTETRICS AND GYNECOLOGY | Facility: CLINIC | Age: 30
End: 2025-04-17

## 2025-04-17 ENCOUNTER — PROCEDURE VISIT (OUTPATIENT)
Dept: PRIMARY CARE | Facility: CLINIC | Age: 30
End: 2025-04-17
Payer: COMMERCIAL

## 2025-04-17 DIAGNOSIS — M99.01 SEGMENTAL AND SOMATIC DYSFUNCTION OF CERVICAL REGION: ICD-10-CM

## 2025-04-17 DIAGNOSIS — M99.07 SEGMENTAL AND SOMATIC DYSFUNCTION OF UPPER EXTREMITY: ICD-10-CM

## 2025-04-17 DIAGNOSIS — M99.00 SEGMENTAL AND SOMATIC DYSFUNCTION OF HEAD REGION: ICD-10-CM

## 2025-04-17 DIAGNOSIS — M99.02 SEGMENTAL AND SOMATIC DYSFUNCTION OF THORACIC REGION: ICD-10-CM

## 2025-04-17 DIAGNOSIS — S03.40XA SPRAIN OF TEMPOROMANDIBULAR JOINT, INITIAL ENCOUNTER: Primary | ICD-10-CM

## 2025-04-17 DIAGNOSIS — M99.08 SEGMENTAL AND SOMATIC DYSFUNCTION OF RIB CAGE: ICD-10-CM

## 2025-04-17 DIAGNOSIS — M62.838 MUSCLE SPASM: ICD-10-CM

## 2025-04-17 LAB
CYTOLOGY CMNT CVX/VAG CYTO-IMP: NORMAL
HPV HR 12 DNA GENITAL QL NAA+PROBE: POSITIVE
HPV HR GENOTYPES PNL CVX NAA+PROBE: POSITIVE
HPV16 DNA SPEC QL NAA+PROBE: NEGATIVE
HPV18 DNA SPEC QL NAA+PROBE: NEGATIVE
LAB AP HPV GENOTYPE QUESTION: YES
LAB AP HPV HR: NORMAL
LAB AP PAP ADDITIONAL TESTS: NORMAL
LABORATORY COMMENT REPORT: NORMAL
LMP START DATE: NORMAL
PATH REPORT.TOTAL CANCER: NORMAL

## 2025-04-17 PROCEDURE — 96372 THER/PROPH/DIAG INJ SC/IM: CPT | Performed by: FAMILY MEDICINE

## 2025-04-17 PROCEDURE — 98927 OSTEOPATH MANJ 5-6 REGIONS: CPT | Performed by: FAMILY MEDICINE

## 2025-04-17 PROCEDURE — 99213 OFFICE O/P EST LOW 20 MIN: CPT | Performed by: FAMILY MEDICINE

## 2025-04-17 RX ORDER — KETOROLAC TROMETHAMINE 30 MG/ML
30 INJECTION, SOLUTION INTRAMUSCULAR; INTRAVENOUS ONCE
Status: COMPLETED | OUTPATIENT
Start: 2025-04-17 | End: 2025-04-17

## 2025-04-17 RX ORDER — NAPROXEN 500 MG/1
500 TABLET ORAL 2 TIMES DAILY PRN
Qty: 28 TABLET | Refills: 0 | Status: SHIPPED | OUTPATIENT
Start: 2025-04-17 | End: 2025-05-01

## 2025-04-17 RX ORDER — TIZANIDINE 2 MG/1
2 TABLET ORAL EVERY 6 HOURS PRN
Qty: 30 TABLET | Refills: 0 | Status: CANCELLED | OUTPATIENT
Start: 2025-04-17 | End: 2025-04-27

## 2025-04-17 RX ADMIN — KETOROLAC TROMETHAMINE 30 MG: 30 INJECTION, SOLUTION INTRAMUSCULAR; INTRAVENOUS at 09:49

## 2025-04-17 NOTE — PROGRESS NOTES
Subjective   Patient ID: Roxanna Hargrove is a 30 y.o. female who presents for No chief complaint on file..  HPI    Review of Systems    Objective   Physical Exam    Assessment/Plan   Problem List Items Addressed This Visit    None  Visit Diagnoses         Sprain of temporomandibular joint, initial encounter    -  Primary    Relevant Medications    ketorolac (Toradol) injection 30 mg (Start on 4/17/2025 10:15 AM)      Muscle spasm        Relevant Medications    ketorolac (Toradol) injection 30 mg (Start on 4/17/2025 10:15 AM)      Segmental and somatic dysfunction of cervical region          Segmental and somatic dysfunction of head region          Segmental and somatic dysfunction of rib cage          Segmental and somatic dysfunction of thoracic region          Segmental and somatic dysfunction of upper extremity            Exercises  Ice  Naproxen  Toradol  OMT   upper extremity            Exercises  Ice  Naproxen  Toradol  Osteopathic manipulative therapy was utilized  In the head region as suboccipital release  In the cervical region as functional positional release  In the upper extremity as muscle energy  In the rib region as functional positional release  In the Thoracics as high velocity low amplitude thrust and muscle energy

## 2025-04-23 ENCOUNTER — HOSPITAL ENCOUNTER (EMERGENCY)
Facility: HOSPITAL | Age: 30
Discharge: HOME | End: 2025-04-23
Attending: EMERGENCY MEDICINE
Payer: COMMERCIAL

## 2025-04-23 ENCOUNTER — APPOINTMENT (OUTPATIENT)
Dept: RADIOLOGY | Facility: HOSPITAL | Age: 30
End: 2025-04-23
Payer: COMMERCIAL

## 2025-04-23 VITALS
OXYGEN SATURATION: 100 % | RESPIRATION RATE: 17 BRPM | SYSTOLIC BLOOD PRESSURE: 103 MMHG | TEMPERATURE: 98 F | HEIGHT: 61 IN | HEART RATE: 76 BPM | WEIGHT: 138 LBS | BODY MASS INDEX: 26.06 KG/M2 | DIASTOLIC BLOOD PRESSURE: 69 MMHG

## 2025-04-23 DIAGNOSIS — B27.89 OTHER INFECTIOUS MONONUCLEOSIS WITH OTHER COMPLICATION: Primary | ICD-10-CM

## 2025-04-23 LAB
ALBUMIN SERPL BCP-MCNC: 3.7 G/DL (ref 3.4–5)
ALP SERPL-CCNC: 403 U/L (ref 33–110)
ALT SERPL W P-5'-P-CCNC: 294 U/L (ref 7–45)
ANION GAP SERPL CALC-SCNC: 14 MMOL/L (ref 10–20)
APPEARANCE UR: CLEAR
AST SERPL W P-5'-P-CCNC: 210 U/L (ref 9–39)
BASOPHILS # BLD MANUAL: 0 X10*3/UL (ref 0–0.1)
BASOPHILS NFR BLD MANUAL: 0 %
BILIRUB SERPL-MCNC: 3.3 MG/DL (ref 0–1.2)
BILIRUB UR STRIP.AUTO-MCNC: ABNORMAL MG/DL
BUN SERPL-MCNC: 7 MG/DL (ref 6–23)
CALCIUM SERPL-MCNC: 8.8 MG/DL (ref 8.6–10.3)
CHLORIDE SERPL-SCNC: 101 MMOL/L (ref 98–107)
CO2 SERPL-SCNC: 27 MMOL/L (ref 21–32)
COLOR UR: ABNORMAL
CREAT SERPL-MCNC: 0.56 MG/DL (ref 0.5–1.05)
EGFRCR SERPLBLD CKD-EPI 2021: >90 ML/MIN/1.73M*2
EOSINOPHIL # BLD MANUAL: 0 X10*3/UL (ref 0–0.7)
EOSINOPHIL NFR BLD MANUAL: 0 %
ERYTHROCYTE [DISTWIDTH] IN BLOOD BY AUTOMATED COUNT: 12.7 % (ref 11.5–14.5)
GLUCOSE SERPL-MCNC: 95 MG/DL (ref 74–99)
GLUCOSE UR STRIP.AUTO-MCNC: NORMAL MG/DL
HCG UR QL IA.RAPID: NEGATIVE
HCT VFR BLD AUTO: 40.8 % (ref 36–46)
HETEROPH AB SERPLBLD QL IA.RAPID: POSITIVE
HGB BLD-MCNC: 13.2 G/DL (ref 12–16)
HOLD SPECIMEN: NORMAL
IMM GRANULOCYTES # BLD AUTO: 0.04 X10*3/UL (ref 0–0.7)
IMM GRANULOCYTES NFR BLD AUTO: 0.5 % (ref 0–0.9)
KETONES UR STRIP.AUTO-MCNC: NEGATIVE MG/DL
LEUKOCYTE ESTERASE UR QL STRIP.AUTO: NEGATIVE
LIPASE SERPL-CCNC: 29 U/L (ref 9–82)
LYMPHOCYTES # BLD MANUAL: 3.43 X10*3/UL (ref 1.2–4.8)
LYMPHOCYTES NFR BLD MANUAL: 47 %
MCH RBC QN AUTO: 27.1 PG (ref 26–34)
MCHC RBC AUTO-ENTMCNC: 32.4 G/DL (ref 32–36)
MCV RBC AUTO: 84 FL (ref 80–100)
MONOCYTES # BLD MANUAL: 0.51 X10*3/UL (ref 0.1–1)
MONOCYTES NFR BLD MANUAL: 7 %
MUCOUS THREADS #/AREA URNS AUTO: NORMAL /LPF
NEUTS SEG # BLD MANUAL: 1.68 X10*3/UL (ref 1.2–7)
NEUTS SEG NFR BLD MANUAL: 23 %
NITRITE UR QL STRIP.AUTO: NEGATIVE
NRBC BLD-RTO: 0 /100 WBCS (ref 0–0)
PATH REVIEW-CBC DIFFERENTIAL: NORMAL
PH UR STRIP.AUTO: 5.5 [PH]
PLASMA CELLS # BLD MANUAL: 0.15 X10*3/UL
PLASMA CELLS NFR BLD MANUAL: 2 %
PLATELET # BLD AUTO: 96 X10*3/UL (ref 150–450)
POTASSIUM SERPL-SCNC: 3.6 MMOL/L (ref 3.5–5.3)
PROT SERPL-MCNC: 7.1 G/DL (ref 6.4–8.2)
PROT UR STRIP.AUTO-MCNC: NEGATIVE MG/DL
RBC # BLD AUTO: 4.87 X10*6/UL (ref 4–5.2)
RBC # UR STRIP.AUTO: ABNORMAL MG/DL
RBC #/AREA URNS AUTO: NORMAL /HPF
RBC MORPH BLD: ABNORMAL
SODIUM SERPL-SCNC: 138 MMOL/L (ref 136–145)
SP GR UR STRIP.AUTO: 1.01
SQUAMOUS #/AREA URNS AUTO: NORMAL /HPF
TOTAL CELLS COUNTED BLD: 100
UROBILINOGEN UR STRIP.AUTO-MCNC: NORMAL MG/DL
VARIANT LYMPHS # BLD MANUAL: 1.53 X10*3/UL (ref 0–0.5)
VARIANT LYMPHS NFR BLD: 21 %
WBC # BLD AUTO: 7.3 X10*3/UL (ref 4.4–11.3)
WBC #/AREA URNS AUTO: NORMAL /HPF

## 2025-04-23 PROCEDURE — 81001 URINALYSIS AUTO W/SCOPE: CPT | Performed by: EMERGENCY MEDICINE

## 2025-04-23 PROCEDURE — 85007 BL SMEAR W/DIFF WBC COUNT: CPT | Performed by: EMERGENCY MEDICINE

## 2025-04-23 PROCEDURE — 36415 COLL VENOUS BLD VENIPUNCTURE: CPT | Performed by: EMERGENCY MEDICINE

## 2025-04-23 PROCEDURE — 76705 ECHO EXAM OF ABDOMEN: CPT

## 2025-04-23 PROCEDURE — 96375 TX/PRO/DX INJ NEW DRUG ADDON: CPT

## 2025-04-23 PROCEDURE — 81025 URINE PREGNANCY TEST: CPT | Performed by: EMERGENCY MEDICINE

## 2025-04-23 PROCEDURE — 85027 COMPLETE CBC AUTOMATED: CPT | Performed by: EMERGENCY MEDICINE

## 2025-04-23 PROCEDURE — 96361 HYDRATE IV INFUSION ADD-ON: CPT

## 2025-04-23 PROCEDURE — 2500000004 HC RX 250 GENERAL PHARMACY W/ HCPCS (ALT 636 FOR OP/ED): Mod: JZ | Performed by: EMERGENCY MEDICINE

## 2025-04-23 PROCEDURE — 76705 ECHO EXAM OF ABDOMEN: CPT | Performed by: RADIOLOGY

## 2025-04-23 PROCEDURE — 83690 ASSAY OF LIPASE: CPT | Performed by: EMERGENCY MEDICINE

## 2025-04-23 PROCEDURE — 86308 HETEROPHILE ANTIBODY SCREEN: CPT | Performed by: EMERGENCY MEDICINE

## 2025-04-23 PROCEDURE — 99284 EMERGENCY DEPT VISIT MOD MDM: CPT | Mod: 25 | Performed by: EMERGENCY MEDICINE

## 2025-04-23 PROCEDURE — 96374 THER/PROPH/DIAG INJ IV PUSH: CPT

## 2025-04-23 PROCEDURE — 80053 COMPREHEN METABOLIC PANEL: CPT | Performed by: EMERGENCY MEDICINE

## 2025-04-23 RX ORDER — ONDANSETRON 4 MG/1
4 TABLET, FILM COATED ORAL EVERY 6 HOURS
Qty: 12 TABLET | Refills: 0 | Status: SHIPPED | OUTPATIENT
Start: 2025-04-23 | End: 2025-04-26

## 2025-04-23 RX ORDER — KETOROLAC TROMETHAMINE 30 MG/ML
15 INJECTION, SOLUTION INTRAMUSCULAR; INTRAVENOUS ONCE
Status: COMPLETED | OUTPATIENT
Start: 2025-04-23 | End: 2025-04-23

## 2025-04-23 RX ORDER — ONDANSETRON HYDROCHLORIDE 2 MG/ML
4 INJECTION, SOLUTION INTRAVENOUS ONCE
Status: COMPLETED | OUTPATIENT
Start: 2025-04-23 | End: 2025-04-23

## 2025-04-23 RX ADMIN — ONDANSETRON 4 MG: 2 INJECTION INTRAMUSCULAR; INTRAVENOUS at 08:51

## 2025-04-23 RX ADMIN — KETOROLAC TROMETHAMINE 15 MG: 30 INJECTION, SOLUTION INTRAMUSCULAR at 09:51

## 2025-04-23 RX ADMIN — SODIUM CHLORIDE 1000 ML: 0.9 INJECTION, SOLUTION INTRAVENOUS at 08:48

## 2025-04-23 ASSESSMENT — PAIN DESCRIPTION - PAIN TYPE
TYPE: ACUTE PAIN
TYPE: ACUTE PAIN

## 2025-04-23 ASSESSMENT — PAIN SCALES - GENERAL
PAINLEVEL_OUTOF10: 7
PAINLEVEL_OUTOF10: 4
PAINLEVEL_OUTOF10: 5 - MODERATE PAIN

## 2025-04-23 ASSESSMENT — PAIN DESCRIPTION - LOCATION: LOCATION: ABDOMEN

## 2025-04-23 ASSESSMENT — PAIN - FUNCTIONAL ASSESSMENT
PAIN_FUNCTIONAL_ASSESSMENT: 0-10

## 2025-04-23 ASSESSMENT — PAIN DESCRIPTION - DESCRIPTORS
DESCRIPTORS: DISCOMFORT
DESCRIPTORS: DISCOMFORT

## 2025-04-23 NOTE — Clinical Note
Roxanna Hargrove was seen and treated in our emergency department on 4/23/2025.  She may return to work on 04/28/2025.       If you have any questions or concerns, please don't hesitate to call.      Taylor Jama MD

## 2025-04-23 NOTE — ED PROVIDER NOTES
HPI   Chief Complaint   Patient presents with    Nausea    Headache       Patient presents emergency secondary to headache, abdominal discomfort, nausea, yellowing of the eyes and dark urine.  Patient states symptoms started about a week ago.  Patient's symptoms have waxed and waned.  She has had some low-grade fevers with temps up to 99.  She describes diffuse abdominal discomfort mainly upper abdomen.  Does not seem to worsen with eating.  She has had no vomiting but does have a lot of nausea.  She denies pregnancy.  She has had 2 previous C-sections no other abdominal surgeries.  No chest pain or shortness of breath.  No cough or congestion.  She has a history of tachycardia which she is maintained on medication.  She has no other symptoms at this time.                          [place] Coma Scale Score: 15                  Patient History   Medical History[1]  Surgical History[2]  Family History[3]  Social History[4]    Physical Exam   ED Triage Vitals [04/23/25 0824]   Temperature Heart Rate Respirations BP   36.7 °C (98 °F) 89 16 106/73      Pulse Ox Temp src Heart Rate Source Patient Position   99 % -- -- --      BP Location FiO2 (%)     -- --       Physical Exam  Vitals and nursing note reviewed.   Constitutional:       Appearance: Normal appearance. She is well-developed.   HENT:      Head: Normocephalic.      Right Ear: Tympanic membrane normal.      Left Ear: Tympanic membrane normal.      Nose: Nose normal.      Mouth/Throat:      Mouth: Mucous membranes are moist.   Eyes:      Extraocular Movements: Extraocular movements intact.      Pupils: Pupils are equal, round, and reactive to light.   Cardiovascular:      Rate and Rhythm: Normal rate and regular rhythm.      Pulses: Normal pulses.      Heart sounds: Normal heart sounds.   Pulmonary:      Effort: Pulmonary effort is normal.      Breath sounds: Normal breath sounds. No wheezing, rhonchi or rales.   Abdominal:      General: Abdomen is flat. Bowel  sounds are normal.      Palpations: Abdomen is soft.      Tenderness: There is abdominal tenderness.      Comments: Minimal diffuse tenderness.  No guarding or rebound.  Abdomen is soft with normal bowel sounds.   Musculoskeletal:         General: No swelling or tenderness. Normal range of motion.      Cervical back: Normal range of motion and neck supple.   Skin:     General: Skin is warm and dry.      Capillary Refill: Capillary refill takes less than 2 seconds.   Neurological:      General: No focal deficit present.      Mental Status: She is alert and oriented to person, place, and time.      Sensory: No sensory deficit.      Motor: No weakness.   Psychiatric:         Mood and Affect: Mood normal.         Behavior: Behavior normal.       Labs Reviewed   CBC WITH AUTO DIFFERENTIAL - Abnormal       Result Value    WBC 7.3      nRBC 0.0      RBC 4.87      Hemoglobin 13.2      Hematocrit 40.8      MCV 84      MCH 27.1      MCHC 32.4      RDW 12.7      Platelets 96 (*)     Immature Granulocytes %, Automated 0.5      Immature Granulocytes Absolute, Automated 0.04     COMPREHENSIVE METABOLIC PANEL - Abnormal    Glucose 95      Sodium 138      Potassium 3.6      Chloride 101      Bicarbonate 27      Anion Gap 14      Urea Nitrogen 7      Creatinine 0.56      eGFR >90      Calcium 8.8      Albumin 3.7      Alkaline Phosphatase 403 (*)     Total Protein 7.1       (*)     Bilirubin, Total 3.3 (*)      (*)    URINALYSIS WITH REFLEX CULTURE AND MICROSCOPIC - Abnormal    Color, Urine Dark-Yellow      Appearance, Urine Clear      Specific Gravity, Urine 1.013      pH, Urine 5.5      Protein, Urine NEGATIVE      Glucose, Urine Normal      Blood, Urine 0.2 (2+) (*)     Ketones, Urine NEGATIVE      Bilirubin, Urine 1 (1+) (*)     Urobilinogen, Urine Normal      Nitrite, Urine NEGATIVE      Leukocyte Esterase, Urine NEGATIVE     MONONUCLEOSIS SCREEN (HETEROPHILE ANTIBODY) - Abnormal    Mononucleosis Screen Positive  (*)    MANUAL DIFFERENTIAL - Abnormal    Neutrophils %, Manual 23.0      Lymphocytes %, Manual 47.0      Monocytes %, Manual 7.0      Eosinophils %, Manual 0.0      Basophils %, Manual 0.0      Atypical Lymphocytes %, Manual 21.0      Plasma Cells %, Manual 2.0      Seg Neutrophils Absolute, Manual 1.68      Lymphocytes Absolute, Manual 3.43      Monocytes Absolute, Manual 0.51      Eosinophils Absolute, Manual 0.00      Basophils Absolute, Manual 0.00      Atypical Lymphs Absolute, Manual 1.53 (*)     Plasma Cells Absolute, Manual 0.15      Total Cells Counted 100      RBC Morphology No significant RBC morphology present     HCG, URINE, QUALITATIVE - Normal    HCG, Urine NEGATIVE     LIPASE - Normal    Lipase 29      Narrative:     Venipuncture immediately after or during the administration of Metamizole may lead to falsely low results. Testing should be performed immediately prior to Metamizole dosing.   URINALYSIS WITH REFLEX CULTURE AND MICROSCOPIC    Narrative:     The following orders were created for panel order Urinalysis with Reflex Culture and Microscopic.  Procedure                               Abnormality         Status                     ---------                               -----------         ------                     Urinalysis with Reflex C...[[id number]]  Abnormal            Final result               Extra Urine Gray Tube[[id number]]                            In process                   Please view results for these tests on the individual orders.   EXTRA URINE GRAY TUBE   PATH REVIEW-CBC DIFFERENTIAL    Pathologist Review-CBC Differential        Value: Absolute lymphocytosis with reactive-type appearing lymphocytes; mild thrombocytopenia.    Note: Causes of peripheral lymphocytosis include (but are not limited to): reactive conditions including viral infections (EBV, CMV, acute HIV, and other viruses), drug reactions, stress reactions, and neoplastic proliferations (including CLL). Flow  cytometry immunophenotyping of peripheral blood would be helpful to define a monoclonal population, if clinically indicated. Please correlate with the clinical and physical examination findings.     URINALYSIS MICROSCOPIC WITH REFLEX CULTURE    WBC, Urine 1-5      RBC, Urine 1-2      Squamous Epithelial Cells, Urine 1-9 (SPARSE)      Mucus, Urine FEW       Pain Management Panel  More data exists         Latest Ref Rng & Units 11/11/2024 11/9/2023   Pain Management Panel   Amphetamine Screen, Urine Presumptive Negative Presumptive Negative  Presumptive Positive    Barbiturate Screen, Urine Presumptive Negative Presumptive Negative  Presumptive Negative    Benzodiazepines Screen, Urine Presumptive Negative Presumptive Negative  Presumptive Negative    Fentanyl Screen, Urine Presumptive Negative Presumptive Negative  Presumptive Negative    Methadone Screen, Urine Presumptive Negative Presumptive Negative  -     US right upper quadrant   Final Result   Somewhat abnormal appearing liver as detailed above. Mildly enlarged   with nonspecific echogenic cortical triads. See discussion above             MACRO:   None        Signed by: Joseph Schoenberger 4/23/2025 11:54 AM   Dictation workstation:   [id number]        ED Course & MDM   Diagnoses as of 04/23/25 1230   Other infectious mononucleosis with other complication       Medical Decision Making  Patient presents emergency department secondary to multiple symptoms.  Differential diagnosis for this patient is viral syndrome, dehydration, gallbladder disease, pancreatitis or other acute cause.  Patient is evaluated in the emergency department CBC CMP and lipase.  She is given 1 L normal saline IV fluid bolus.  She is given 4 mg of IV Zofran and 50 mg of IV Toradol for pain and nausea.  Patient's laboratory workup shows elevated lymphocytes and atypical lymphocytes.  There is also elevated liver enzymes with alk phos of 403 AST of 210 bilirubin of 3.3 and ALT of 294.   Ultrasound of the right upper quadrant shows somewhat abnormal appearing liver that is mildly enlarged.  No evidence of gallbladder disease.  Patient had Monospot performed which is positive.  At this time patient is hemodynamically stable.  She was informed of all results.  She is to avoid any risk of impacting the abdomen.  She is given a note for work.  She is to follow-up with her primary care physician for repeat liver enzymes.  She is stable for discharge at this time.        Procedure  Procedures         [1]   Past Medical History:  Diagnosis Date    30 weeks gestation of pregnancy ([facility]) 10/09/2019    Pregnancy with 30 completed weeks gestation    33 weeks gestation of pregnancy ([facility]) 10/31/2019    Pregnancy with 33 completed weeks gestation    Anxiety disorder, unspecified     Anxiety and depression    Choroid plexus cyst of fetus in [place] pregnancy 10/31/2023    Gestational diabetes mellitus in pregnancy, diet controlled ([facility]) 2019    Diet controlled gestational diabetes mellitus (GDM) in third trimester    Hyperemesis gravidarum ([facility]) 10/31/2023    Kidney stone on right side 06/15/2023    Mass of lower outer quadrant of right breast 06/15/2023    Personal history of gestational diabetes 2020    History of gestational diabetes mellitus (GDM)    Personal history of other complications of pregnancy, childbirth and the puerperium 2020    History of malposition of fetus    Personal history of other diseases of the respiratory system 2021    History of asthma    Personal history of other infectious and parasitic diseases 11/15/2021    History of pinworm infection    Personal history of urinary calculi     History of kidney stones    Pyelectasis of fetus on prenatal ultrasound ([facility]) 10/31/2023   [2]   Past Surgical History:  Procedure Laterality Date     SECTION, LOW TRANSVERSE  2019     SECTION, LOW TRANSVERSE  2022    LITHOTRIPSY      [3]   Family History  Problem Relation Name Age of Onset    Hypertension Mother      Kidney nephrosis Mother      Hypertension Father     [4]   Social History  Tobacco Use    Smoking status: Never    Smokeless tobacco: Never   Vaping Use    Vaping status: Every Day    Substances: Nicotine, Flavoring    Devices: Disposable, Refillable tank   Substance Use Topics    Alcohol use: Yes     Comment: Once a month    Drug use: Never        Taylor Jama MD  04/23/25 9928

## 2025-04-29 ENCOUNTER — TELEPHONE (OUTPATIENT)
Dept: PRIMARY CARE | Facility: CLINIC | Age: 30
End: 2025-04-29
Payer: COMMERCIAL

## 2025-04-29 DIAGNOSIS — B27.09 GAMMAHERPESVIRAL MONONUCLEOSIS WITH OTHER COMPLICATIONS: ICD-10-CM

## 2025-04-29 DIAGNOSIS — R17 JAUNDICE: Primary | ICD-10-CM

## 2025-05-07 ENCOUNTER — APPOINTMENT (OUTPATIENT)
Dept: OBSTETRICS AND GYNECOLOGY | Facility: CLINIC | Age: 30
End: 2025-05-07
Payer: COMMERCIAL

## 2025-05-07 DIAGNOSIS — B27.09 GAMMAHERPESVIRAL MONONUCLEOSIS WITH OTHER COMPLICATIONS: Primary | ICD-10-CM

## 2025-05-08 ENCOUNTER — APPOINTMENT (OUTPATIENT)
Dept: OBSTETRICS AND GYNECOLOGY | Facility: CLINIC | Age: 30
End: 2025-05-08
Payer: COMMERCIAL

## 2025-05-08 VITALS
DIASTOLIC BLOOD PRESSURE: 70 MMHG | BODY MASS INDEX: 25.86 KG/M2 | SYSTOLIC BLOOD PRESSURE: 102 MMHG | HEIGHT: 61 IN | WEIGHT: 137 LBS

## 2025-05-08 DIAGNOSIS — R87.810 CERVICAL HIGH RISK HPV (HUMAN PAPILLOMAVIRUS) TEST POSITIVE: ICD-10-CM

## 2025-05-08 DIAGNOSIS — R87.612 LGSIL ON PAP SMEAR OF CERVIX: ICD-10-CM

## 2025-05-08 PROCEDURE — 99213 OFFICE O/P EST LOW 20 MIN: CPT | Performed by: OBSTETRICS & GYNECOLOGY

## 2025-05-08 PROCEDURE — 57452 EXAM OF CERVIX W/SCOPE: CPT | Performed by: OBSTETRICS & GYNECOLOGY

## 2025-05-08 ASSESSMENT — PATIENT HEALTH QUESTIONNAIRE - PHQ9
1. LITTLE INTEREST OR PLEASURE IN DOING THINGS: NOT AT ALL
SUM OF ALL RESPONSES TO PHQ9 QUESTIONS 1 & 2: 0
2. FEELING DOWN, DEPRESSED OR HOPELESS: NOT AT ALL

## 2025-05-08 NOTE — PROGRESS NOTES
Subjective   Patient ID: Roxanna Hargrove is a 30 y.o. female who presents for No chief complaint on file..  HPI 30 years old G2, P2 with prior tubal sterilization presents for colposcopy.  Her Pap smear came back low-grade EVA.  She reports prior history of abnormal Pap smear but has a new partner.  I explained the colposcopy and her Pap smear results and possible biopsy.  She signed a consent form and we proceeded.    Review of Systems   All other systems reviewed and are negative.      Objective   Physical Exam  Constitutional:       Appearance: Normal appearance.   Pulmonary:      Effort: Pulmonary effort is normal.   Neurological:      Mental Status: She is alert.   Psychiatric:         Mood and Affect: Mood normal.         Judgment: Judgment normal.     Patient ID: Roxanna Hargrove is a 30 y.o. female.    Colposcopy    Date/Time: 5/8/2025 1:53 PM    Performed by: Shruthi James MD  Authorized by: Shruthi James MD    Procedure location: cervix    Consent:     Patient questions answered: yes      Risks and benefits of the procedure and its alternatives discussed: yes      Consent obtained:  Verbal and written    Consent given by:  Patient  Indication:     Cervical indication(s): CHEMO 1    Pre-procedure:     Prep solution(s): acetic acid    Procedure:     Colposcopy with: colposcopy only      Colposcopy details:  Colposcopy normal no acetowhite or vascular abnormalities seen.  No biopsy done.    Cervix visibility: fully visualized      SCJ visibility: fully visualized      Cervical impression: normal/benign    Post-procedure:     Patient tolerance of procedure:  Patient tolerated the procedure well with no immediate complications    Instructions and paperwork completed: yes    Comments:      I have recommended a repeat Pap smear in 6 months      Assessment/Plan   Problem List Items Addressed This Visit    None  Visit Diagnoses         Codes      LGSIL on Pap smear of cervix     R87.612      Cervical high risk HPV  (human papillomavirus) test positive     R87.810        Colposcopy satisfactory and negative.  No biopsy done.  Recommend a repeat Pap smear in 6 months.         Shruthi James MD 05/08/25 1:24 PM

## 2025-05-09 LAB
ALBUMIN SERPL-MCNC: 4.4 G/DL (ref 3.6–5.1)
ALP SERPL-CCNC: 219 U/L (ref 31–125)
ALT SERPL-CCNC: 138 U/L (ref 6–29)
ANION GAP SERPL CALCULATED.4IONS-SCNC: 9 MMOL/L (CALC) (ref 7–17)
AST SERPL-CCNC: 101 U/L (ref 10–30)
BASOPHILS # BLD AUTO: 67 CELLS/UL (ref 0–200)
BASOPHILS NFR BLD AUTO: 0.8 %
BILIRUB SERPL-MCNC: 1.4 MG/DL (ref 0.2–1.2)
BUN SERPL-MCNC: 15 MG/DL (ref 7–25)
CALCIUM SERPL-MCNC: 9.4 MG/DL (ref 8.6–10.2)
CHLORIDE SERPL-SCNC: 102 MMOL/L (ref 98–110)
CO2 SERPL-SCNC: 27 MMOL/L (ref 20–32)
CREAT SERPL-MCNC: 0.63 MG/DL (ref 0.5–0.97)
EGFRCR SERPLBLD CKD-EPI 2021: 122 ML/MIN/1.73M2
EOSINOPHIL # BLD AUTO: 59 CELLS/UL (ref 15–500)
EOSINOPHIL NFR BLD AUTO: 0.7 %
ERYTHROCYTE [DISTWIDTH] IN BLOOD BY AUTOMATED COUNT: 13.4 % (ref 11–15)
GLUCOSE SERPL-MCNC: 89 MG/DL (ref 65–99)
HCT VFR BLD AUTO: 39.5 % (ref 35–45)
HGB BLD-MCNC: 12.9 G/DL (ref 11.7–15.5)
LYMPHOCYTES # BLD AUTO: 5544 CELLS/UL (ref 850–3900)
LYMPHOCYTES NFR BLD AUTO: 66 %
MCH RBC QN AUTO: 27.4 PG (ref 27–33)
MCHC RBC AUTO-ENTMCNC: 32.7 G/DL (ref 32–36)
MCV RBC AUTO: 83.9 FL (ref 80–100)
MONOCYTES # BLD AUTO: 882 CELLS/UL (ref 200–950)
MONOCYTES NFR BLD AUTO: 10.5 %
NEUTROPHILS # BLD AUTO: 1848 CELLS/UL (ref 1500–7800)
NEUTROPHILS NFR BLD AUTO: 22 %
PLATELET # BLD AUTO: 251 THOUSAND/UL (ref 140–400)
PMV BLD REES-ECKER: 10.3 FL (ref 7.5–12.5)
POTASSIUM SERPL-SCNC: 3.9 MMOL/L (ref 3.5–5.3)
PROT SERPL-MCNC: 8 G/DL (ref 6.1–8.1)
RBC # BLD AUTO: 4.71 MILLION/UL (ref 3.8–5.1)
SODIUM SERPL-SCNC: 138 MMOL/L (ref 135–146)
WBC # BLD AUTO: 8.4 THOUSAND/UL (ref 3.8–10.8)

## 2025-05-14 ENCOUNTER — APPOINTMENT (OUTPATIENT)
Dept: PRIMARY CARE | Facility: CLINIC | Age: 30
End: 2025-05-14
Payer: COMMERCIAL

## 2025-06-19 DIAGNOSIS — R00.2 HEART PALPITATIONS: ICD-10-CM

## 2025-06-19 RX ORDER — NEBIVOLOL 5 MG/1
5 TABLET ORAL DAILY
Qty: 90 TABLET | Refills: 1 | Status: SHIPPED | OUTPATIENT
Start: 2025-06-19 | End: 2025-12-16

## 2025-06-24 ASSESSMENT — ENCOUNTER SYMPTOMS: BACK PAIN: 1

## 2025-06-24 NOTE — PROGRESS NOTES
Subjective   Patient ID: Roxanna Hargrove is a 30 y.o. female who presents for upper back pain  Back Pain  This is a recurrent problem. The pain is present in the thoracic spine. The pain is moderate.       Review of Systems   Musculoskeletal:  Positive for back pain.       Objective   Physical Exam  General: Patient is alert and orient x3 and appears in no acute distress.  Some pain distress.    Neck: Decreased range of motion in all planes    Heart: Regular rate and rhythm no murmurs clicks or gallops    Lungs: Clear to auscultation bilaterally without rhonchi rales or wheezing      Musculoskeletal: Strength was grossly intact.  Deep tendon reflexes intact.  Sensation intact.  Decreased range of motion    Osteopathic structural:  In the head region there is increased suboccipital tension  In the cervical region C2 extended rotated right side bent left  In the rib region first rib on the left was tender to palpation resisted exhalation  In the upper extremity  right upper extremity was protracted inferior tension of pectoralis minor  In the thoracic region  T2 was extended rotated right side bent right, T7 flexed rotated right side bent right        Assessment/Plan   Problem List Items Addressed This Visit    None  Visit Diagnoses         Dorsalgia of cervicothoracic region    -  Primary      Muscle spasm          Segmental and somatic dysfunction of cervical region          Segmental and somatic dysfunction of head region          Segmental and somatic dysfunction of rib cage          Segmental and somatic dysfunction of thoracic region          Segmental and somatic dysfunction of upper extremity                Osteopathic manipulative therapy was used  In the head region as suboccipital release  In the cervical region as functional positional release  In the upper extremity as muscle energy  In the rib region as functional positional release  In the Thoracics as high velocity low amplitude thrust and muscle  energy      Patient tolerated the procedure well and noticed improvement after the treatment.

## 2025-06-25 ENCOUNTER — APPOINTMENT (OUTPATIENT)
Dept: PRIMARY CARE | Facility: CLINIC | Age: 30
End: 2025-06-25
Payer: COMMERCIAL

## 2025-06-25 DIAGNOSIS — M99.01 SEGMENTAL AND SOMATIC DYSFUNCTION OF CERVICAL REGION: ICD-10-CM

## 2025-06-25 DIAGNOSIS — M99.02 SEGMENTAL AND SOMATIC DYSFUNCTION OF THORACIC REGION: ICD-10-CM

## 2025-06-25 DIAGNOSIS — M62.838 MUSCLE SPASM: ICD-10-CM

## 2025-06-25 DIAGNOSIS — M99.00 SEGMENTAL AND SOMATIC DYSFUNCTION OF HEAD REGION: ICD-10-CM

## 2025-06-25 DIAGNOSIS — M54.2 DORSALGIA OF CERVICOTHORACIC REGION: Primary | ICD-10-CM

## 2025-06-25 DIAGNOSIS — M99.08 SEGMENTAL AND SOMATIC DYSFUNCTION OF RIB CAGE: ICD-10-CM

## 2025-06-25 DIAGNOSIS — M54.6 DORSALGIA OF CERVICOTHORACIC REGION: Primary | ICD-10-CM

## 2025-06-25 DIAGNOSIS — M99.07 SEGMENTAL AND SOMATIC DYSFUNCTION OF UPPER EXTREMITY: ICD-10-CM

## 2025-06-25 PROCEDURE — 98927 OSTEOPATH MANJ 5-6 REGIONS: CPT | Performed by: FAMILY MEDICINE

## 2025-06-25 PROCEDURE — 99213 OFFICE O/P EST LOW 20 MIN: CPT | Performed by: FAMILY MEDICINE

## 2025-07-11 ENCOUNTER — APPOINTMENT (OUTPATIENT)
Dept: OBSTETRICS AND GYNECOLOGY | Facility: CLINIC | Age: 30
End: 2025-07-11
Payer: COMMERCIAL

## 2025-07-14 DIAGNOSIS — R00.2 HEART PALPITATIONS: ICD-10-CM

## 2025-07-14 RX ORDER — NEBIVOLOL 5 MG/1
5 TABLET ORAL DAILY
Qty: 90 TABLET | Refills: 1 | Status: SHIPPED | OUTPATIENT
Start: 2025-07-14 | End: 2026-01-10

## 2025-10-10 ENCOUNTER — APPOINTMENT (OUTPATIENT)
Dept: OBSTETRICS AND GYNECOLOGY | Facility: CLINIC | Age: 30
End: 2025-10-10
Payer: COMMERCIAL